# Patient Record
Sex: FEMALE | Race: WHITE | HISPANIC OR LATINO | Employment: FULL TIME | URBAN - METROPOLITAN AREA
[De-identification: names, ages, dates, MRNs, and addresses within clinical notes are randomized per-mention and may not be internally consistent; named-entity substitution may affect disease eponyms.]

---

## 2017-01-23 ENCOUNTER — TELEPHONE (OUTPATIENT)
Dept: OBSTETRICS AND GYNECOLOGY | Facility: CLINIC | Age: 37
End: 2017-01-23

## 2017-01-23 DIAGNOSIS — Z34.90 PREGNANCY, UNSPECIFIED GESTATIONAL AGE: Primary | ICD-10-CM

## 2017-01-23 LAB
ABO + RH BLD: NORMAL
HBV SURFACE AG SERPL QL IA: NEGATIVE
HCT VFR BLD AUTO: 36 % (ref 36–46)
HGB BLD-MCNC: 12.6 G/DL (ref 12–16)
HIV 1+2 AB+HIV1 P24 AG SERPL QL IA: NORMAL
INDIRECT COOMBS: NEGATIVE
MCV RBC AUTO: 89 FL (ref 82–108)
PLATELET # BLD AUTO: 361 K/ΜL (ref 150–399)
RPR: NORMAL
RUBELLA IMMUNE STATUS: 7.23

## 2017-02-06 ENCOUNTER — LAB VISIT (OUTPATIENT)
Dept: LAB | Facility: HOSPITAL | Age: 37
End: 2017-02-06
Attending: OBSTETRICS & GYNECOLOGY
Payer: COMMERCIAL

## 2017-02-06 ENCOUNTER — INITIAL PRENATAL (OUTPATIENT)
Dept: OBSTETRICS AND GYNECOLOGY | Facility: CLINIC | Age: 37
End: 2017-02-06
Payer: COMMERCIAL

## 2017-02-06 ENCOUNTER — PROCEDURE VISIT (OUTPATIENT)
Dept: OBSTETRICS AND GYNECOLOGY | Facility: CLINIC | Age: 37
End: 2017-02-06
Payer: COMMERCIAL

## 2017-02-06 VITALS
WEIGHT: 152.75 LBS | DIASTOLIC BLOOD PRESSURE: 72 MMHG | SYSTOLIC BLOOD PRESSURE: 112 MMHG | BODY MASS INDEX: 26.22 KG/M2

## 2017-02-06 DIAGNOSIS — Z34.90 PREGNANCY, UNSPECIFIED GESTATIONAL AGE: ICD-10-CM

## 2017-02-06 DIAGNOSIS — Z34.01 ENCOUNTER FOR SUPERVISION OF NORMAL FIRST PREGNANCY IN FIRST TRIMESTER: Primary | ICD-10-CM

## 2017-02-06 DIAGNOSIS — Z34.01 ENCOUNTER FOR SUPERVISION OF NORMAL FIRST PREGNANCY IN FIRST TRIMESTER: ICD-10-CM

## 2017-02-06 LAB
GLUCOSE SERPL-MCNC: 99 MG/DL
TSH SERPL DL<=0.005 MIU/L-ACNC: 1.71 UIU/ML

## 2017-02-06 PROCEDURE — 76817 TRANSVAGINAL US OBSTETRIC: CPT | Mod: S$GLB,,, | Performed by: PEDIATRICS

## 2017-02-06 PROCEDURE — 0502F SUBSEQUENT PRENATAL CARE: CPT | Mod: S$GLB,,, | Performed by: OBSTETRICS & GYNECOLOGY

## 2017-02-06 PROCEDURE — 99999 PR PBB SHADOW E&M-EST. PATIENT-LVL II: CPT | Mod: PBBFAC,,, | Performed by: OBSTETRICS & GYNECOLOGY

## 2017-02-06 PROCEDURE — 82947 ASSAY GLUCOSE BLOOD QUANT: CPT

## 2017-02-06 PROCEDURE — 84443 ASSAY THYROID STIM HORMONE: CPT

## 2017-02-06 NOTE — MR AVS SNAPSHOT
[facility]  [address]  [place] [address]  Phone: [phone]  Fax: [phone]                  Kathy DEAN   2017 1:45 PM   Initial Prenatal    Description:  Female : 1980   Provider:  Dominique Starkey MD   Department:  [facility]           Reason for Visit     Initial Prenatal Visit           Diagnoses this Visit        Comments    Encounter for supervision of normal first pregnancy in first trimester    -  Primary            To Do List           Future Appointments        Provider Department Dept Phone    3/6/2017 1:30 PM Dominique Starkey MD [facility] [phone]    3/6/2017 1:45 PM ADDITIONS, SPECIAL LWSC [facility] [phone]      Goals (5 Years of Data)     None      [facility]     [facility] -  Assistance  Registered nurses in the [facility] Center provide clinical advisement, health education, appointment booking, and other advisory services.  Call for this free service at [phone].             Medications           Message regarding Medications     Verify the changes and/or additions to your medication regime listed below are the same as discussed with your clinician today.  If any of these changes or additions are incorrect, please notify your healthcare provider.             Verify that the below list of medications is an accurate representation of the medications you are currently taking.  If none reported, the list may be blank. If incorrect, please contact your healthcare provider. Carry this list with you in case of emergency.           Current Medications     PNV95/FERROUS FUMARATE/FA (PRENATAL MULTIVITAMINS ORAL) Take by mouth.           Clinical Reference Information           Prenatal Vitals     Enc. Date GA Prenatal Vitals Prenatal Pulse Pain Level Urine Albumin/Glucose Edema Presentation Dilation/Effacement/Station    17 8w2d 112/72 / 69.3 kg (152 lb 12.5  oz)  /  / Absent  0          Your Vitals Were     BP Weight Last Period BMI       112/72 69.3 kg (152 lb 12.5 oz) 12/10/2016 26.22 kg/m2       Allergies as of 2/6/2017     No Known Allergies      Immunizations Administered on Date of Encounter - 2/6/2017     None      Orders Placed During Today's Visit      Normal Orders This Visit    C. trachomatis/N. gonorrhoeae by AMP DNA Urine     Urine culture     Future Labs/Procedures Expected by Expires    GLUCOSE, RANDOM  2/6/2017 4/7/2018    TSH  2/6/2017 4/7/2018      [facility] Sign-Up     Activating your [facility] account is as easy as 1-2-3!     1) Visit [email or web address], select Sign Up Now, enter this activation code and your date of birth, then select Next.  [email or web address]  Expires: 3/23/2017  1:49 PM      2) Create a username and password to use when you visit [facility] in the future and select a security question in case you lose your password and select Next.    3) Enter your e-mail address and click Sign Up!    Additional Information  If you have questions, please e-mail [email or web address] or call [phone] to talk to our [facility] staff. Remember, [facility] is NOT to be used for urgent needs. For medical emergencies, dial 911.         Language Assistance Services     ATTENTION: Language assistance services are available, free of charge. Please call [phone].      ATENCIÓN: Si habla español, tiene a forrest disposición servicios gratuitos de asistencia lingüística. Llame al [phone].     [facility] Ý: N?u b?n nói Ti?ng Vi?t, có các d?ch v? h? tr? ngôn ng? mi?n phí dành cho b?n. G?i s? [phone].         [facility]'s [facility] complies with applicable Federal civil rights laws and does not discriminate on the basis of race, color, national origin, age, disability, or sex.

## 2017-02-06 NOTE — MR AVS SNAPSHOT
[facility]  [address]  [place] [address]  Phone: [phone]  Fax: [phone]                  Kathy DEAN   2017 2:30 PM   Procedure visit    Description:  Female : 1980   Provider:  [facility]   Department:  [facility]           Diagnoses this Visit        Comments    Pregnancy, unspecified gestational age                To Do List           Future Appointments        Provider Department Dept Phone    3/6/2017 1:30 PM Dominique Starkey MD [facility] [phone]      Goals (5 Years of Data)     None      [facility]     [facility] Nurse Care Line -  Assistance  Registered nurses in the [facility] provide clinical advisement, health education, appointment booking, and other advisory services.  Call for this free service at [phone].             Medications           Message regarding Medications     Verify the changes and/or additions to your medication regime listed below are the same as discussed with your clinician today.  If any of these changes or additions are incorrect, please notify your healthcare provider.             Verify that the below list of medications is an accurate representation of the medications you are currently taking.  If none reported, the list may be blank. If incorrect, please contact your healthcare provider. Carry this list with you in case of emergency.           Current Medications     PNV95/FERROUS FUMARATE/FA (PRENATAL MULTIVITAMINS ORAL) Take by mouth.           Clinical Reference Information           Prenatal Vitals     Enc. Date GA Prenatal Vitals Prenatal Pulse Pain Level Urine Albumin/Glucose Edema Presentation Dilation/Effacement/Station    17 8w2d 112/72 / 69.3 kg (152 lb 12.5 oz)  /  / Absent  0          Your Vitals Were     Last Period                   12/10/2016           Allergies as of 2017     No Known Allergies      Immunizations  Administered on Date of Encounter - 2/6/2017     None      Orders Placed During Today's Visit      Normal Orders This Visit    US OB/GYN Procedure (Viewpoint) - Extended List       [facility] Sign-Up     Activating your [facility] account is as easy as 1-2-3!     1) Visit [email or web address], select Sign Up Now, enter this activation code and your date of birth, then select Next.  [email or web address]  Expires: 3/23/2017  1:49 PM      2) Create a username and password to use when you visit [facility] in the future and select a security question in case you lose your password and select Next.    3) Enter your e-mail address and click Sign Up!    Additional Information  If you have questions, please e-mail [email or web address] or call [phone] to talk to our [facility] staff. Remember, [facility] is NOT to be used for urgent needs. For medical emergencies, dial 911.         Language Assistance Services     ATTENTION: Language assistance services are available, free of charge. Please call [phone].      ATENCIÓN: Si habla español, tiene a forrest disposición servicios gratuitos de asistencia lingüística. Llame al [phone].     CHÚ Ý: N?u b?n nói Ti?ng Vi?t, có các d?ch v? h? tr? ngôn ng? mi?n phí dành cho b?n. G?i s? [phone].         [facility]'s [facility] complies with applicable Federal civil rights laws and does not discriminate on the basis of race, color, national origin, age, disability, or sex.

## 2017-02-06 NOTE — PROCEDURES
Procedures     Indication:     gestation age determination(KENYA NASIR).   Maternal age (36 years).   ____________________________________________________________________________  History:     Age: 36 years. Maternal age at EDC: 36 years. : 1 Para: 0.  ____________________________________________________________________________  Dating:    LMP: 12/10/16 EDC: 17 GA by LMP: 8w2d  Current Scan on: 17 EDC: 17 GA by current scan: 8w2d      Best Overall Assessment: 17 EDC: 17 Assessed GA: 8w2d  The calculation of the gestational age by current scan was based on CRL.  The Best Overall Assessment is based on the LMP.  ____________________________________________________________________________  Early Pregnancy Assessment:    Biometry:  CRL 17.9 mm 79th% 8w2d (8w0d to 8w4d)  Gestational Sac present. Yolk Sac present. Embryo present.     Heart activity: present. Heart rate: 178 bpm.     ____________________________________________________________________________  Maternal Structures:    Cervix: Cervical length: 42 mm.  Right Ovary: normal.   Right Ovary size: 19 mm x 14 mm x 27 mm. Volume: 3.8 ml.   Left Ovary: normal.   Left Ovary size: 27 mm x 16 mm x 26 mm. Volume: 5.9 ml.  Cul de Sac / Pouch of Fernando: no free fluid visible.  ____________________________________________________________________________  Report Summary:    Impression:     Single viable intrauterine pregnancy consistent with LMP dating.  Embryo grossly WNL with normal cardiac activity.    Normal uterus, cervix and adnexae as noted above.  No fluid seen in cul-de-sac.     Recommendations:     Suggest repeat scan as you feel clinically indicated.

## 2017-02-24 ENCOUNTER — TELEPHONE (OUTPATIENT)
Dept: OBSTETRICS AND GYNECOLOGY | Facility: CLINIC | Age: 37
End: 2017-02-24

## 2017-02-24 NOTE — TELEPHONE ENCOUNTER
Spoke with patient informed that I tried to call the number I was given back but it is a fax number. I informed patient that we do not get authorization for this test. Patient was quoted $810 even though she is AMA. I did explain to patient that she will meet her deductible regardless with delivery this year.       I spoke with Baltazar from [id number] asked him to look into this and see why she was quoted so much since she was AMA.

## 2017-02-24 NOTE — TELEPHONE ENCOUNTER
Patient insurance company called - Needs predetermining information for Materna 21- Pt name, ,procedure code,diagnosis,place of svc, doctors name-their phone # is [phone]

## 2017-03-06 ENCOUNTER — ROUTINE PRENATAL (OUTPATIENT)
Dept: OBSTETRICS AND GYNECOLOGY | Facility: CLINIC | Age: 37
End: 2017-03-06
Payer: COMMERCIAL

## 2017-03-06 VITALS
BODY MASS INDEX: 27.19 KG/M2 | SYSTOLIC BLOOD PRESSURE: 106 MMHG | DIASTOLIC BLOOD PRESSURE: 68 MMHG | WEIGHT: 158.38 LBS

## 2017-03-06 DIAGNOSIS — Z34.01 ENCOUNTER FOR SUPERVISION OF NORMAL FIRST PREGNANCY IN FIRST TRIMESTER: Primary | ICD-10-CM

## 2017-03-06 DIAGNOSIS — O09.521 AMA (ADVANCED MATERNAL AGE) MULTIGRAVIDA 35+, FIRST TRIMESTER: ICD-10-CM

## 2017-03-06 PROCEDURE — 0502F SUBSEQUENT PRENATAL CARE: CPT | Mod: S$GLB,,, | Performed by: OBSTETRICS & GYNECOLOGY

## 2017-03-06 PROCEDURE — 99999 PR PBB SHADOW E&M-EST. PATIENT-LVL II: CPT | Mod: PBBFAC,,, | Performed by: OBSTETRICS & GYNECOLOGY

## 2017-03-06 NOTE — MR AVS SNAPSHOT
[facility]  [address]  [place] [address]  Phone: [phone]  Fax: [phone]                  Kathy Vera   3/6/2017 1:30 PM   Routine Prenatal    Description:  Female : 1980   Provider:  Dominique Starkey MD   Department:  [facility]           Reason for Visit     Routine Prenatal Visit           Diagnoses this Visit        Comments    Encounter for supervision of normal first pregnancy in first trimester    -  Primary     AMA (advanced maternal age) multigravida 35+, first trimester                To Do List           Future Appointments        Provider Department Dept Phone    4/3/2017 2:15 PM Dominique Starkey MD [facility] [phone]    2017 2:15 PM Dominique Starkey MD [facility] [phone]      Goals (5 Years of Data)     None      Follow-Up and Disposition     Return in about 4 weeks (around 4/3/2017) for OB visit.    Follow-up and Disposition History      [facility]     [facility] On Call Nurse [facility] Line -  Assistance  Registered nurses in the [facility] On Call Center provide clinical advisement, health education, appointment booking, and other advisory services.  Call for this free service at [phone].             Medications           Message regarding Medications     Verify the changes and/or additions to your medication regime listed below are the same as discussed with your clinician today.  If any of these changes or additions are incorrect, please notify your healthcare provider.             Verify that the below list of medications is an accurate representation of the medications you are currently taking.  If none reported, the list may be blank. If incorrect, please contact your healthcare provider. Carry this list with you in case of emergency.           Current Medications     PNV95/FERROUS FUMARATE/FA (PRENATAL MULTIVITAMINS ORAL) Take by mouth.           Clinical Reference Information            Prenatal Vitals     Enc. Date GA Prenatal Vitals Prenatal Pulse Pain Level Urine Albumin/Glucose Edema Presentation Dilation/Effacement/Station    3/6/17 12w2d 106/68 / 71.9 kg (158 lb 6.4 oz)  / 158 / Absent  0 Negative / Negative None / None / None / No      2/6/17 8w2d 112/72 / 69.3 kg (152 lb 12.5 oz)  /  / Absent  0          Your Vitals Were     BP Weight Last Period BMI       106/68 71.9 kg (158 lb 6.4 oz) 12/10/2016 27.19 kg/m2       Allergies as of 3/6/2017     No Known Allergies      Immunizations Administered on Date of Encounter - 3/6/2017     None      Orders Placed During Today's Visit     Future Labs/Procedures Expected by Expires    Miscellaneous Sendout Test Blood  3/6/2017 5/5/2018      Language Assistance Services     ATTENTION: Language assistance services are available, free of charge. Please call [phone].      ATENCIÓN: Si habla español, tiene a forrest disposición servicios gratuitos de asistencia lingüística. Llame al [phone].     CHÚ Ý: N?u b?n nói Ti?ng Vi?t, có các d?ch v? h? tr? ngôn ng? mi?n phí jimh cho b?n. G?i s? [phone].         [facility] complies with applicable Federal civil rights laws and does not discriminate on the basis of race, color, national origin, age, disability, or sex.

## 2017-03-13 ENCOUNTER — TELEPHONE (OUTPATIENT)
Dept: OBSTETRICS AND GYNECOLOGY | Facility: CLINIC | Age: 37
End: 2017-03-13

## 2017-03-13 NOTE — TELEPHONE ENCOUNTER
[facility], calling to find out how long it takes to get [id number] results. Pt informed per Aaron that the results come back between 7 to 14 days.

## 2017-03-15 ENCOUNTER — TELEPHONE (OUTPATIENT)
Dept: OBSTETRICS AND GYNECOLOGY | Facility: CLINIC | Age: 37
End: 2017-03-15

## 2017-03-15 NOTE — TELEPHONE ENCOUNTER
Dr. Starkey patient called -would like to know when her Materna 21 test results will be in and can she have written results? And patient feels very congested, what else can she take besides what's on the medications list?

## 2017-03-17 ENCOUNTER — TELEPHONE (OUTPATIENT)
Dept: OBSTETRICS AND GYNECOLOGY | Facility: CLINIC | Age: 37
End: 2017-03-17

## 2017-03-17 NOTE — TELEPHONE ENCOUNTER
Spoke with patient informed KebzavaV87 results normal. Patient will  gender from the [place] office.     Also patient wanted to know what else she can take for her cold. Per Dr. Starkey there is nothing else she can take. Advised if she is having a fever she needs to go get tested for the flu. Pt not sure if she has had a fever she will start checking it. Patient will continue taking Mucinex sparingly and see how she feels next week and give us a call.

## 2017-04-03 ENCOUNTER — ROUTINE PRENATAL (OUTPATIENT)
Dept: OBSTETRICS AND GYNECOLOGY | Facility: CLINIC | Age: 37
End: 2017-04-03
Payer: COMMERCIAL

## 2017-04-03 VITALS
BODY MASS INDEX: 27.66 KG/M2 | DIASTOLIC BLOOD PRESSURE: 68 MMHG | SYSTOLIC BLOOD PRESSURE: 114 MMHG | WEIGHT: 161.19 LBS

## 2017-04-03 DIAGNOSIS — Z34.02 ENCOUNTER FOR SUPERVISION OF NORMAL FIRST PREGNANCY IN SECOND TRIMESTER: Primary | ICD-10-CM

## 2017-04-03 PROCEDURE — 99999 PR PBB SHADOW E&M-EST. PATIENT-LVL II: CPT | Mod: PBBFAC,,, | Performed by: OBSTETRICS & GYNECOLOGY

## 2017-04-03 PROCEDURE — 0502F SUBSEQUENT PRENATAL CARE: CPT | Mod: S$GLB,,, | Performed by: OBSTETRICS & GYNECOLOGY

## 2017-04-03 NOTE — MR AVS SNAPSHOT
[facility]  [address]  [place] [address]  Phone: [phone]  Fax: [phone]                  Kathy Vera   4/3/2017 2:15 PM   Routine Prenatal    Description:  Female : 1980   Provider:  Dominique Starkey MD   Department:  [facility]           Reason for Visit     Routine Prenatal Visit           Diagnoses this Visit        Comments    Encounter for supervision of normal first pregnancy in second trimester    -  Primary            To Do List           Future Appointments        Provider Department Dept Phone    2017 2:15 PM Dominique Starkey MD [facility] [phone]      Goals (5 Years of Data)     None      Follow-Up and Disposition     Return in about 4 weeks (around 2017) for OB visit.    Follow-up and Disposition History      [facility]     [facility] On Call Nurse Care Line -  Assistance  Unless otherwise directed by your provider, please contact [facility], our nurse care line that is available for  assistance.     Registered nurses in the [facility] On Call Center provide: appointment scheduling, clinical advisement, health education, and other advisory services.  Call: [phone] (toll free)               Medications           Message regarding Medications     Verify the changes and/or additions to your medication regime listed below are the same as discussed with your clinician today.  If any of these changes or additions are incorrect, please notify your healthcare provider.             Verify that the below list of medications is an accurate representation of the medications you are currently taking.  If none reported, the list may be blank. If incorrect, please contact your healthcare provider. Carry this list with you in case of emergency.           Current Medications     PNV95/FERROUS FUMARATE/FA (PRENATAL MULTIVITAMINS ORAL) Take by mouth.           Clinical Reference Information           Prenatal  Vitals     Enc. Date GA Prenatal Vitals Prenatal Pulse Pain Level Urine Albumin/Glucose Edema Presentation Dilation/Effacement/Station    4/3/17 16w2d 114/68 / 73.1 kg (161 lb 2.5 oz)  / 150 / Absent  0 Negative / Negative None / None / None / No      3/6/17 12w2d 106/68 / 71.9 kg (158 lb 6.4 oz)  / 158 / Absent  0 Negative / Negative None / None / None / No      2/6/17 8w2d 112/72 / 69.3 kg (152 lb 12.5 oz)  /  / Absent  0          Your Vitals Were     BP Weight Last Period BMI       114/68 73.1 kg (161 lb 2.5 oz) 12/10/2016 27.66 kg/m2       Allergies as of 4/3/2017     No Known Allergies      Immunizations Administered on Date of Encounter - 4/3/2017     None      Orders Placed During Today's Visit     Future Labs/Procedures Expected by Expires    [facility] Procedure (Viewpoint)  As directed 4/3/2018      Language Assistance Services     ATTENTION: Language assistance services are available, free of charge. Please call [phone].      ATENCIÓN: Si habla moni, tiene a forrest disposición servicios gratuitos de asistencia lingüística. Llame al [phone].     CHÚ Ý: N?u b?n nói Ti?ng Vi?t, có các d?ch v? h? tr? ngôn ng? mi?n phí dành cho b?n. G?i s? [phone].         [facility] complies with applicable Federal civil rights laws and does not discriminate on the basis of race, color, national origin, age, disability, or sex.

## 2017-04-25 ENCOUNTER — OFFICE VISIT (OUTPATIENT)
Dept: MATERNAL FETAL MEDICINE | Facility: CLINIC | Age: 37
End: 2017-04-25
Attending: OBSTETRICS & GYNECOLOGY
Payer: COMMERCIAL

## 2017-04-25 DIAGNOSIS — O09.513 ADVANCED MATERNAL AGE, PRIMIGRAVIDA, THIRD TRIMESTER: ICD-10-CM

## 2017-04-25 DIAGNOSIS — Z36.89 ENCOUNTER FOR ULTRASOUND TO CHECK FETAL GROWTH: ICD-10-CM

## 2017-04-25 DIAGNOSIS — O09.512 ADVANCED MATERNAL AGE, PRIMIGRAVIDA IN SECOND TRIMESTER, ANTEPARTUM: ICD-10-CM

## 2017-04-25 DIAGNOSIS — Z36.89 ENCOUNTER FOR FETAL ANATOMIC SURVEY: Primary | ICD-10-CM

## 2017-04-25 DIAGNOSIS — Z34.02 ENCOUNTER FOR SUPERVISION OF NORMAL FIRST PREGNANCY IN SECOND TRIMESTER: ICD-10-CM

## 2017-04-25 PROCEDURE — 76811 OB US DETAILED SNGL FETUS: CPT | Mod: S$GLB,,, | Performed by: PEDIATRICS

## 2017-04-25 PROCEDURE — 99499 UNLISTED E&M SERVICE: CPT | Mod: S$GLB,,, | Performed by: PEDIATRICS

## 2017-04-25 NOTE — LETTER
April 27, 2017      Dominique Starkey MD  [address]  [address]  [place] [address]           [facility]  [address]  [facility] [address]  Phone: [phone]          Patient: Kathy Vera   MR Number: [id number]   YOB: 1980   Date of Visit: 4/25/2017       Dear Dr. Dominique Starkey:    Thank you for referring Kathy Vera to me for evaluation. Attached you will find relevant portions of my assessment and plan of care.    If you have questions, please do not hesitate to call me. I look forward to following Kathy Vera along with you.    Sincerely,    Isadora Chaudhary MD    Enclosure  CC:  No Recipients    If you would like to receive this communication electronically, please contact [email or web address] or [phone] to request more information on [facility] access.    For providers and/or their staff who would like to refer a patient to [facility], please contact us through our one-stop-shop provider referral line, [facility] , at [phone].    If you feel you have received this communication in error or would no longer like to receive these types of communications, please e-mail [email or web address]

## 2017-04-27 ENCOUNTER — TELEPHONE (OUTPATIENT)
Dept: OBSTETRICS AND GYNECOLOGY | Facility: CLINIC | Age: 37
End: 2017-04-27

## 2017-04-27 ENCOUNTER — ROUTINE PRENATAL (OUTPATIENT)
Dept: OBSTETRICS AND GYNECOLOGY | Facility: CLINIC | Age: 37
End: 2017-04-27
Payer: COMMERCIAL

## 2017-04-27 VITALS
SYSTOLIC BLOOD PRESSURE: 108 MMHG | BODY MASS INDEX: 28.12 KG/M2 | DIASTOLIC BLOOD PRESSURE: 66 MMHG | WEIGHT: 163.81 LBS

## 2017-04-27 DIAGNOSIS — O99.350 PREGNANCY RELATED CARPAL TUNNEL SYNDROME, ANTEPARTUM: Primary | ICD-10-CM

## 2017-04-27 DIAGNOSIS — G56.00 PREGNANCY RELATED CARPAL TUNNEL SYNDROME, ANTEPARTUM: Primary | ICD-10-CM

## 2017-04-27 PROBLEM — O09.512 ADVANCED MATERNAL AGE, PRIMIGRAVIDA IN SECOND TRIMESTER, ANTEPARTUM: Status: ACTIVE | Noted: 2017-04-27

## 2017-04-27 PROCEDURE — 1160F RVW MEDS BY RX/DR IN RCRD: CPT | Mod: S$GLB,,, | Performed by: NURSE PRACTITIONER

## 2017-04-27 PROCEDURE — 99999 PR PBB SHADOW E&M-EST. PATIENT-LVL II: CPT | Mod: PBBFAC,,, | Performed by: NURSE PRACTITIONER

## 2017-04-27 PROCEDURE — 99213 OFFICE O/P EST LOW 20 MIN: CPT | Mod: S$GLB,,, | Performed by: NURSE PRACTITIONER

## 2017-04-27 NOTE — TELEPHONE ENCOUNTER
Dr. Starkey's pt calling, 19 wks ob, states that she is experiencing numbness in her hands and also pain in her right hand. Pt states that the pain in her hand prevents her from sleeping through the night. Pt states she called her PCP, but they do not have any availability. Pt would like to know what she should do. Pt's # [phone].

## 2017-04-27 NOTE — PROGRESS NOTES
Chief Complaint:  Obstetric Problem Visit    HPI: Kathy Vera is a 36 y.o., , at 19w5d wks here reporting tingling in bilateral hands with right greater than left and worse at night. She has had some of these symptoms prior to pregnancy as well but now worse. Denies swelling, temperature changes, or numbness/tingling on one side of the body, HA. She denies any vaginal bleeding, leaking fluids, abnormal vaginal discharge complaints.   Has initial visit with [facility] on May 3rd and switching care at that time.      Physical Exam:   FHT: 136  Bilateral Hands: + tineal sign, normal temp, 2+ pulses, normal cap refill and movement  /66  Wt 74.3 kg (163 lb 12.8 oz)  LMP 12/10/2016  BMI 28.12 kg/m2    Plan:  1. Carpal tunnel with pregnancy--exam with +Tineals sign. Discussed prevention measures and braces and night time bracing. Possible symptoms prior pregnancy and exacerbated with pregnacy

## 2017-04-27 NOTE — PROGRESS NOTES
Indication  ========    Fetal anatomy survey.    History  ======    Risk Factors  History risk factors: AMA    Pregnancy History  ==============    Maternal Lab Tests  Result: Mat T 21 - Neg  Wants to know gender: yes    Method  ======    Transabdominal ultrasound examination. View: Good view.    Pregnancy  =========    Lyons pregnancy. Number of fetuses: 1.    Dating  ======    LMP on: 12/10/2016  Cycle: regular cycle  GA by LMP 19 w + 3 d  BRENDA by LMP: 9/16/2017  Ultrasound examination on: 4/25/2017  GA by U/S based upon: AC, BPD, Femur, HC  GA by U/S 19 w + 5 d  BRENDA by U/S: 9/14/2017  Assigned: The Best Overall Assessment is based on the LMP.  Assigned GA 19 w + 3 d  Assigned BRENDA: 9/16/2017        General Evaluation  ==============    Cardiac activity: present.  bpm.  Fetal movements: visualized.  Presentation: breech.  Placenta:  Placental site: anterior.  Umbilical cord: Cord vessels: 3 vessel cord. Cord insertion: placental insertion: normal.  Amniotic fluid: Amount of AF: normal amount.    Fetal Biometry  ============    Fetal Biometry  BPD 44.4 mm 50% 19w 3d Hadlock  OFD 63.1 mm 98% 21w 4d Jignesh  .2 mm 71% 20w 0d Hadlock  .9 mm 61% 19w 6d Hadlock  Femur 30.0 mm 37% 19w 2d Hadlock  Cerebellum tr 19.8 mm 57% 19w 5d Cameron  CM 4.4 mm 34% Nicolaides  Nuchal fold 4.80 mm  Humerus 29.0 mm 52% 19w 3d Jignesh   g 56% 19w 4d Hadlock  Calculated by: Hadlock (BPD-HC-AC-FL)  EFW (lb) 0 lb  EFW (oz) 11 oz  Cephalic index 0.70 <1% Nicolaides  HC / AC 1.20 71% Hadlock  FL / BPD 0.68 39% Hadlock  FL / AC 0.21 24% Hadlock   bpm  Head / Face / Neck   6.4 mm  Nasal bone 4.7 mm        Fetal Anatomy  ============    Cranium: normal  Lateral ventricles: normal  Choroid plexus: normal  Midline falx: normal  Cavum septi pellucidi: normal  Cerebellum: normal  Cisterna magna: normal  Neck: appears normal  Nuchal  fold: normal  Lips: normal  Profile: normal  Nose: normal  RVOT: normal  LVOT: normal  4-chamber view: 4-chamber normal, septum normal  Situs: normal  Aortic arch: normal  Ductal arch: suboptimal  SVC: normal  IVC: normal  3-vessel view: normal  3-vessel-trachea view: normal  Cardiac axis: normal  Cardiac size: normal  Cardiac rhythm: normal  Rt lung: normal  Lt lung: normal  Diaphragm: normal  Cord insertion: normal  Stomach: normal  Kidneys: normal  Bladder: normal  Abdom. wall: normal  Rt kidney: normal  Lt kidney: normal  Liver: normal  Rt renal artery: normal  Lt renal artery: normal  Cervical spine: normal  Thoracic spine: normal  Lumbar spine: normal  Sacral spine: normal  Rt hand: normal  Lt hand: normal  Rt foot: normal  Lt foot: normal  Gender: female  Wants to know gender: yes  Other: Right hand closed. Left hand open    Maternal Structures  ===============    Uterus / Cervix  Uterus: Normal  Cervical length 38.4 mm  Ovaries / Tubes / Adnexa  Rt ovary: Not visualized  Lt ovary: Not visualized  Pouch of Fernando / Other Structures  Cul de Sac: Visualized        Impression  =========    Normal fetal anatomy with no obvious abnormalities. AMA. DA NWS. All other anatomy is seen.    Biometry is consistent with dating.    Normal amniotic fluid volume per qualitative assessment.  Normal placental location without evidence of previa.  Normal appearing cervical length per trans=abdominal screening.      Recommendation  ==============    Return at 30 - 32 weeks for growth (AMA) and DA.    Thank you again for allowing us to participate in the care of your patients. If you have any questions concerning today's consultation, feel free  to contact me or one of my partners. We can be reached at [phone] during normal business hours. If you have a question after normal  business hours, please contact Labor and Delivery at [phone].

## 2017-04-27 NOTE — TELEPHONE ENCOUNTER
19 5/7 week OB c/o numbness in both hands for over a month and now pain in right hand.  It is interfering with her sleep.  Reassured her that it was probably due to the increased fluid in her body pushing on nerves which is causing numbness and pain.  Recommended elevating hands as much as possible.  Pt insists on being seen.  Scheduled with Basilia today at 1300

## 2017-04-27 NOTE — PATIENT INSTRUCTIONS
Topic  General Pregnancy Information Recommended   (Unless Otherwise Contraindicated Or Restrictions Given To You By Your OB Doctor)      1. Anticipated course of prenatal care       Visits: will be Every 4 wks until 28 weeks, then every 2 weeks until 36 weeks, and then weekly until delivery.    Urine will be collected at each Obstetric visit        2. Nutrition and weight gain     Daily pre-niyah vitamin (recommend taking at night)    Additional 300 calories needed daily   No Sushi, hotdogs, unpasteurized products (milk/cheeses). No large fish such as: shark, jenny mackerel, tile, sword fish    Incorporate 12 ounces of smaller seafood/week and no more than 6oz of albacore tuna    Caffeine: 200 mg/day or 2 cups of caffeine/day    Weight gain recommendations are based off of BMI before pregnancy. Generally patients who with normal weight prior pregnancy it is recommended 25-35 pounds of weight gain during the pregnancy with an estimated weekly gain of 1 pound/wk in 2nd and 3rd Trimester.    4. Sexual Activity   Sexual activity is okay unless you are put on restrictions by your provider. I recommend urinating after intercourse    5. Exercise   Generally pre-pregnancy routine is okay to continue    Drink plenty fluids for hydration    Stop any activity that causes heavy cramping like a period or bright red bleeding and contact your provider   No extreme or contact sports    No exercise on your back for an extended period of time after 20 weeks    6. Hot Tub/Saunas   Avoid hot tubes and saunas    7. Vaccines   Influenza vaccine is recommended by CDC during flu season    Tdap (pertussis or whopping cough) recommended each pregnancy between 27 and 36 weeks    Tdap booster recommended for family and other planned direct caregivers    8. Water   Water is an important nutrient in a good diet. However, it cannot be stressed enough that during pregnancy water is essential. The body has increased circulation  through blood vessels, and without a large increase in water, pregnant women will be dehydrated. It plays an important role in decreasing constipation, preventing  contractions, decreasing swelling, and preventing dizziness. We recommend that you drink 8-10 glasses of water per day.    9. Smoking/Alcohol/Illicit Drug Use   No safe Level    Can lead to problems with pregnancy    Growth of the developing fetus     labor (delivery before 37 weeks)     rupture of the membranes (water breaking before 37 weeks)    Premature separation of the placenta (which may cause bleeding)    American College of Obstetricians and Gynecologists endorses abstinence    Can lead to babies with disabilities    10. Environmental or work hazards   Unless otherwise restricted you may continue work throughout the pregnancy    Notify your provider of any work hazards or chemical exposure concerns   11. Travel     Safe to travel up to 35 weeks    Continue to wear a seatbelt and airbags are still recommended    Drink plenty fluids    Blood clots are a concern during pregnancy with long travel. Recommend compression stockings and moving around at least every 2 hours and staying hydrated.    12. Domestic Violence     Please notify office immediately of any concerns or violence so that we can help direct you to assistance needed    [place] [employer]: [phone]    13. Childbirth classes     List of Childbirth classes from [facility] is available    14. Selecting a Pediatrician   Selecting a pediatrician before delivery is recommended   You can interview pediatricians before delivery    15. Fetal Monitoring     A simple test of your babys well-being is a kick count. After 26 weeks, fetal motion of any kind should be monitored. Further discussion at that time   16.  Labor Signs     Water break, leaking fluids from Vagina prior 37 weeks   Regular contractions,  Contractions that are more than 5-6/hour, getting stronger and painful with lower back pain, does not go away with rest and fluids    17. Postpartum Family Planning     Multiple options available from short term methods to long term reversible and irreversible methods    Discuss with provider as you get closer to delivery    18. Breastfeeding     Classes offered at [facility] and it is recommended to take a class    19. Lifting  In 2013, the National [place] for Occupational Safety and Health (NIOSH) published clinical guidelines for occupational lifting in uncomplicated pregnancies. The recommended weight limits are based on gestational age, intermittent versus repetitive lifting, time (hours/day) spent lifting, and lifting height from floor and distance in 3 front of body. In this guideline, the maximum permissible weight for a woman less than 20 weeks of gestation performing infrequent lifting is 36 pounds (16 kgs) and the maximum permissible weight at ?20 weeks is 26 pounds (12 kgs). For repetitive lifting ?1 hour/day, the maximum weights in the first and second half of pregnancy are 18 pounds (8 kgs) and 13 pounds (6 kgs), respectively, and for repetitive lifting <1 hour/day, the maximum weights are 30 pounds (14 kgs) and 22 pounds (10 kgs), respectively. Although not based on high quality evidence, these guidelines are a reasonable reference for counseling pregnant women     20. Scheduling and Provider Availability     Your Obstetric Doctor is usually here weekly but not every day. We recommend you make 3-4 advanced appointments at a time to accommodate your personal needs and work/school obligations.    We ask that you come 15 minutes prior your scheduled appointment.    For same day appointments (not routine appointments) there is a Nurse Practitioner or another obstetric provider available. Please let the  aware you are an OB patient requesting a same day appointment.

## 2017-05-03 ENCOUNTER — LAB VISIT (OUTPATIENT)
Dept: LAB | Facility: OTHER | Age: 37
End: 2017-05-03
Attending: ADVANCED PRACTICE MIDWIFE
Payer: COMMERCIAL

## 2017-05-03 ENCOUNTER — INITIAL PRENATAL (OUTPATIENT)
Dept: OBSTETRICS AND GYNECOLOGY | Facility: CLINIC | Age: 37
End: 2017-05-03
Payer: COMMERCIAL

## 2017-05-03 VITALS
DIASTOLIC BLOOD PRESSURE: 58 MMHG | BODY MASS INDEX: 28.38 KG/M2 | SYSTOLIC BLOOD PRESSURE: 104 MMHG | WEIGHT: 165.38 LBS

## 2017-05-03 DIAGNOSIS — Z34.92 PREGNANCY, OBSTETRICAL CARE, SECOND TRIMESTER: Primary | ICD-10-CM

## 2017-05-03 DIAGNOSIS — Z67.20 BLOOD TYPE B+: ICD-10-CM

## 2017-05-03 DIAGNOSIS — Z34.92 PREGNANCY, OBSTETRICAL CARE, SECOND TRIMESTER: ICD-10-CM

## 2017-05-03 PROCEDURE — 82105 ALPHA-FETOPROTEIN SERUM: CPT

## 2017-05-03 PROCEDURE — 0502F SUBSEQUENT PRENATAL CARE: CPT | Mod: S$GLB,,, | Performed by: ADVANCED PRACTICE MIDWIFE

## 2017-05-03 PROCEDURE — 36415 COLL VENOUS BLD VENIPUNCTURE: CPT

## 2017-05-03 PROCEDURE — 99999 PR PBB SHADOW E&M-EST. PATIENT-LVL III: CPT | Mod: PBBFAC,,, | Performed by: ADVANCED PRACTICE MIDWIFE

## 2017-05-03 PROCEDURE — 87086 URINE CULTURE/COLONY COUNT: CPT

## 2017-05-03 PROCEDURE — 87591 N.GONORRHOEAE DNA AMP PROB: CPT

## 2017-05-03 NOTE — PROGRESS NOTES
Kathy Vera is a 36 y.o. , presents today for a transfer of prenatal care   C/C: transfer of prenatal care  Dating via LMP c/w 8wk US    Reports + FM, denies VB, LOF or cramping.     SOCIAL HISTORY: Denies emotional/mental/physical/sexual violence or abuse. Feels safe at home. Accompanied today by Elio,  and father of baby. This is first for both! She works at [employer] and Elio is a .     Reports no long-term chronic medical conditions. Denies hx or sx of STDs.    Review of patient's allergies indicates:  No Known Allergies  Past Medical History:   Diagnosis Date    Blood type B POS 5/3/2017    Hyperlipidemia     never medicated     Past Surgical History:   Procedure Laterality Date    WISDOM TOOTH EXTRACTION       Past Surgical History:   Procedure Laterality Date    WISDOM TOOTH EXTRACTION       OB History    Para Term  AB SAB TAB Ectopic Multiple Living   1               # Outcome Date GA Lbr Gamal/2nd Weight Sex Delivery Anes PTL Lv   1 Current               Obstetric Comments   Menarche 12     OB History      Para Term  AB TAB SAB Ectopic Multiple Living    1                 Obstetric Comments    Menarche 12        Social History     Social History    Marital status:      Spouse name: N/A    Number of children: N/A    Years of education: N/A     Occupational History    Not on file.     Social History Main Topics    Smoking status: Never Smoker    Smokeless tobacco: Not on file    Alcohol use No      Comment: Pregnant     Drug use: No    Sexual activity: Yes     Birth control/ protection: None      Comment:  : Elio     Other Topics Concern    Not on file     Social History Narrative    Elio is a     She works at [employer]    First baby for both!     Family History   Problem Relation Age of Onset    Hypertension Mother     Breast cancer Neg Hx     Colon cancer Neg Hx     Ovarian cancer Neg Hx      History    Sexual Activity    Sexual activity: Yes    Birth control/ protection: None     Comment:  : Elio         OBJECTIVE: see prenatal flow sheet  BP (!) 104/58  Wt 75 kg (165 lb 5.5 oz)  LMP 12/10/2016  BMI 28.38 kg/m2  Constitutional/Gen: NAD, appears stated age, well groomed  Lung: normal resp effort  Extremities: FROM, with no edema or tenderness.  Neurologic: A&O x 4, non-focal, cranial nerves 2-12 grossly intact  Psych: affect appropriate and without signs of mood, thought or memory difficulty appreciated    ASSESSMENT:  36 y.o. female  at 20w4d for transfer of prenatal care  Body mass index is 28.38 kg/(m^2).  Patient Active Problem List   Diagnosis    Advanced maternal age, primigravida in second trimester, antepartum    Blood type B POS    Pre preg BMI 24       PLAN:  1. Transfer of prenatal care within [facility] system  -- Continue prenatal care  -- Urine culture and GC/CT today.    2. BMI 24  -- Discussed IOM recommended weight gain of:   Weight category Pre pre BMI  Recommended TWG   Underweight Less than 18.5 28-40    Normal Weight 18.5-24.9  25-35    Overweight 25-29.9  15-25    Obese   30 and greater  11-20   -- Discussed criteria for delivery at [facility] r/t excessive pre-preg weight or excessive weight gain:   Pre-pregnancy BMI over 40 or excess pregnancy weight gain defined as:   Pre-preg BMI < 18.5; Excess weight gain = > 60 pound   Pre-preg BMI 18.5-24.9;  Excess weight gain = > 53 pounds   Pre-preg BMI 25-29.9;  Excess weight gain = > 38 pounds   Pre-preg BMI > 30;  Excess weight gain = > 30 pounds    3. Oriented to practice and midwifery service  -- Pregnancy education and couseling; handouts and booklet provided  -- She has already attended [facility] and has toured facility  -- Reviewed anticipated prenatal course of care and how to contact us  -- Reviewed [facility] guidelines and admission, exclusion, and transfer criteria  -- Precautions/warning signs reviewed  -- Common complaints  of pregnancy  -- Routine prenatal labs including HIV  -- Ultrasounds  -- Childbirth education/hospital/[facility] facilities  -- Nutrition, prepregnant BMI, and recommended weight gain  -- Toxoplasmosis precautions (Cats/Raw Meat)  -- Sexual activity and exercise  -- Environmental/Work hazards  -- Travel  -- Tobacco (Ask, Advise, Assess, Assist, and Arrange), as well as alcohol and drug use  -- Use of any medications (Including supplements, Vitamins, Herbs, or OTC Drugs)  -- Domestic violence screen negative    4. Reviewed genetic testing options. Reviewed available first trimester and/or second  trimester screening options. Reviewed risk of false positive/negative results and recommendation of referral to [facility] in event of a positive result, for NIPT, US, and/or amniocentesis. Reviewed the possible estimated 1 in 300/500 risk of miscarriage with amniocentesis, even with a healthy fetus. Patient already had NIPT - she reports as negative. AFP only offered and ordered today.     5. AMA  -- NIPT neg. AFP today.     6. Reviewed warning signs, precautions, and how/when to contact us.     7. RTC x 4 weeks, call or present sooner prn.     Updated Medication List:  Current Outpatient Prescriptions   Medication Sig Dispense Refill    PNV95/FERROUS FUMARATE/FA (PRENATAL MULTIVITAMINS ORAL) Take by mouth.       No current facility-administered medications for this visit.          Dorothy Red CNM/NP  5/3/2017 6:33 PM

## 2017-05-04 LAB
BACTERIA UR CULT: NO GROWTH
C TRACH DNA SPEC QL NAA+PROBE: NOT DETECTED
N GONORRHOEA DNA SPEC QL NAA+PROBE: NOT DETECTED

## 2017-05-05 LAB
AFP INTERPRETATION: NORMAL
AFP MATERNAL: NEGATIVE
ALPHA FETOPROTEIN MATERNAL: 65.9 NG/ML
ETHNIC ORIGIN: NORMAL
GESTATIONAL AGE (DAYS): 4
GESTATIONAL AGE (WEEKS): 20
GESTATIONAL AGE METHOD: NORMAL
INSULIN DEPEND. DIABETES: NORMAL
M.O.M. ALPHA FETOPROTEIN: 1.16
MATERNAL AGE AT EDD (YRS): 37
MATERNAL WEIGHT (LBS): 165
MULTIPLE GESTATION: NORMAL

## 2017-05-08 ENCOUNTER — TELEPHONE (OUTPATIENT)
Dept: OBSTETRICS AND GYNECOLOGY | Facility: HOSPITAL | Age: 37
End: 2017-05-08

## 2017-05-08 NOTE — TELEPHONE ENCOUNTER
Returned pt phone call.  No answer, left message asking patient to call back and advising to go come to ER if hit her stomach or is having pain, contractions or bleeding.

## 2017-05-12 ENCOUNTER — TELEPHONE (OUTPATIENT)
Dept: OBSTETRICS AND GYNECOLOGY | Facility: CLINIC | Age: 37
End: 2017-05-12

## 2017-05-12 NOTE — TELEPHONE ENCOUNTER
"Yari Chavez's call  Reports pubic bone pressure - has been going on for over a month and then has pain "sharp pulling" aggravated with movement which only lasts a few seconds then resolves with change of position  Feeling "fluttering"  No VB, LOF, or uterine "menstrual like cramping"  Reviewed normal discomforts of pregnancy  Reviewed warning signs  Discussed use of maternity support belt jennifern    Dorothy Red CNM/NP  Certified Nurse Midwife/Nurse Practitioner  5/12/2017 1:45 PM      "

## 2017-05-29 ENCOUNTER — ROUTINE PRENATAL (OUTPATIENT)
Dept: OBSTETRICS AND GYNECOLOGY | Facility: CLINIC | Age: 37
End: 2017-05-29
Payer: COMMERCIAL

## 2017-05-29 VITALS
SYSTOLIC BLOOD PRESSURE: 110 MMHG | WEIGHT: 169.75 LBS | DIASTOLIC BLOOD PRESSURE: 62 MMHG | BODY MASS INDEX: 29.14 KG/M2

## 2017-05-29 DIAGNOSIS — Z34.92 PREGNANCY, OBSTETRICAL CARE, SECOND TRIMESTER: Primary | ICD-10-CM

## 2017-05-29 PROCEDURE — 0502F SUBSEQUENT PRENATAL CARE: CPT | Mod: S$GLB,,, | Performed by: ADVANCED PRACTICE MIDWIFE

## 2017-05-29 PROCEDURE — 99999 PR PBB SHADOW E&M-EST. PATIENT-LVL I: CPT | Mod: PBBFAC,,, | Performed by: ADVANCED PRACTICE MIDWIFE

## 2017-05-29 NOTE — PROGRESS NOTES
36 y.o. female  at 24w2d by LMP c/w 8wk US  Reports + FM, denies VB, LOF, or cramping  TWG 27# for pre preg BMI of 24  AMA  -- Consider 3T US  Reviewed upcoming 28wk labs, (B POS) and orders placed  Discussed [facility] handout   Reviewed warning signs, normal FM,  labor precautions and how/when to call.  RTC x 4 wks, call or present sooner prn.

## 2017-06-26 ENCOUNTER — LAB VISIT (OUTPATIENT)
Dept: LAB | Facility: OTHER | Age: 37
End: 2017-06-26
Attending: ADVANCED PRACTICE MIDWIFE
Payer: COMMERCIAL

## 2017-06-26 ENCOUNTER — ROUTINE PRENATAL (OUTPATIENT)
Dept: OBSTETRICS AND GYNECOLOGY | Facility: CLINIC | Age: 37
End: 2017-06-26
Attending: OBSTETRICS & GYNECOLOGY
Payer: COMMERCIAL

## 2017-06-26 VITALS
BODY MASS INDEX: 29.42 KG/M2 | WEIGHT: 171.44 LBS | DIASTOLIC BLOOD PRESSURE: 86 MMHG | SYSTOLIC BLOOD PRESSURE: 100 MMHG

## 2017-06-26 DIAGNOSIS — Z34.92 PREGNANCY, OBSTETRICAL CARE, SECOND TRIMESTER: ICD-10-CM

## 2017-06-26 DIAGNOSIS — Z3A.28 28 WEEKS GESTATION OF PREGNANCY: Primary | ICD-10-CM

## 2017-06-26 DIAGNOSIS — O09.512 ADVANCED MATERNAL AGE, PRIMIGRAVIDA IN SECOND TRIMESTER, ANTEPARTUM: ICD-10-CM

## 2017-06-26 LAB
ANISOCYTOSIS BLD QL SMEAR: SLIGHT
BASOPHILS # BLD AUTO: 0.01 K/UL
BASOPHILS NFR BLD: 0.1 %
DIFFERENTIAL METHOD: ABNORMAL
EOSINOPHIL # BLD AUTO: 0.3 K/UL
EOSINOPHIL NFR BLD: 2.6 %
ERYTHROCYTE [DISTWIDTH] IN BLOOD BY AUTOMATED COUNT: 15.2 %
GLUCOSE SERPL-MCNC: 143 MG/DL
HCT VFR BLD AUTO: 33.7 %
HGB BLD-MCNC: 11.1 G/DL
HYPOCHROMIA BLD QL SMEAR: ABNORMAL
LYMPHOCYTES # BLD AUTO: 1.8 K/UL
LYMPHOCYTES NFR BLD: 17.7 %
MCH RBC QN AUTO: 30.2 PG
MCHC RBC AUTO-ENTMCNC: 32.9 %
MCV RBC AUTO: 92 FL
MONOCYTES # BLD AUTO: 0.6 K/UL
MONOCYTES NFR BLD: 5.5 %
NEUTROPHILS # BLD AUTO: 7.2 K/UL
NEUTROPHILS NFR BLD: 72.2 %
OVALOCYTES BLD QL SMEAR: ABNORMAL
PLATELET # BLD AUTO: 276 K/UL
PLATELET BLD QL SMEAR: ABNORMAL
PMV BLD AUTO: 8.9 FL
POIKILOCYTOSIS BLD QL SMEAR: SLIGHT
RBC # BLD AUTO: 3.68 M/UL
SCHISTOCYTES BLD QL SMEAR: ABNORMAL
SCHISTOCYTES BLD QL SMEAR: PRESENT
SICKLE CELLS BLD QL SMEAR: ABNORMAL
WBC # BLD AUTO: 10.01 K/UL

## 2017-06-26 PROCEDURE — 36415 COLL VENOUS BLD VENIPUNCTURE: CPT

## 2017-06-26 PROCEDURE — 85025 COMPLETE CBC W/AUTO DIFF WBC: CPT

## 2017-06-26 PROCEDURE — 99999 PR PBB SHADOW E&M-EST. PATIENT-LVL II: CPT | Mod: PBBFAC,,, | Performed by: ADVANCED PRACTICE MIDWIFE

## 2017-06-26 PROCEDURE — 0502F SUBSEQUENT PRENATAL CARE: CPT | Mod: S$GLB,,, | Performed by: ADVANCED PRACTICE MIDWIFE

## 2017-06-26 PROCEDURE — 82950 GLUCOSE TEST: CPT

## 2017-06-26 NOTE — PROGRESS NOTES
" is a  based in [place]. He is often gone for several days at a time. He is going to check with his employers regarding plans to get him home when she is in labor.  Getting 28 week labs done today.  C/o carpal tunnel syndrome on left wrist>right. Reports severe pain in the morning and is concerned that her thumb is "locking." Has tried wrist guard with only temporary relief. Will see PCP for evaluation.  Planning to take the [facility] class; encouraged to sign up asap.  Breastfeeding flip chart reviewed.  TWG 29 lb. Has decreased the amount of chocolate milk she drinks. Choosemyplate.gov recommended for guidance.   "

## 2017-06-27 ENCOUNTER — TELEPHONE (OUTPATIENT)
Dept: OBSTETRICS AND GYNECOLOGY | Facility: CLINIC | Age: 37
End: 2017-06-27

## 2017-06-27 DIAGNOSIS — R73.09 ELEVATED GLUCOSE: Primary | ICD-10-CM

## 2017-06-27 NOTE — TELEPHONE ENCOUNTER
Returned patient phone call.  Pt c/o cold and URI sx.  Concerned if these sx can hurt the baby.  Advised high fever or respiratory distress can be cause for concern but routine cold sx are managed with comfort measures such as Tylenol and saline nasal spray, not a cause of concern for the baby.

## 2017-06-29 ENCOUNTER — TELEPHONE (OUTPATIENT)
Dept: OBSTETRICS AND GYNECOLOGY | Facility: HOSPITAL | Age: 37
End: 2017-06-29

## 2017-06-29 NOTE — TELEPHONE ENCOUNTER
Left voicemail following up on her call from yesterday regarding elevated 1hr    Dorothy Red CNM/NP  Certified Nurse Midwife/Nurse Practitioner  6/29/2017 7:42 AM

## 2017-06-30 ENCOUNTER — LAB VISIT (OUTPATIENT)
Dept: LAB | Facility: OTHER | Age: 37
End: 2017-06-30
Attending: INTERNAL MEDICINE
Payer: COMMERCIAL

## 2017-06-30 ENCOUNTER — PATIENT MESSAGE (OUTPATIENT)
Dept: OBSTETRICS AND GYNECOLOGY | Facility: CLINIC | Age: 37
End: 2017-06-30

## 2017-06-30 ENCOUNTER — TELEPHONE (OUTPATIENT)
Dept: OBSTETRICS AND GYNECOLOGY | Facility: CLINIC | Age: 37
End: 2017-06-30

## 2017-06-30 DIAGNOSIS — R73.09 ELEVATED GLUCOSE: ICD-10-CM

## 2017-06-30 DIAGNOSIS — O09.522 SUPERVISION OF ELDERLY MULTIGRAVIDA, SECOND TRIMESTER: Primary | ICD-10-CM

## 2017-06-30 LAB
GLUCOSE SERPL-MCNC: 127 MG/DL
GLUCOSE SERPL-MCNC: 130 MG/DL
GLUCOSE SERPL-MCNC: 175 MG/DL
GLUCOSE SERPL-MCNC: 86 MG/DL

## 2017-06-30 PROCEDURE — 36415 COLL VENOUS BLD VENIPUNCTURE: CPT

## 2017-06-30 PROCEDURE — 82951 GLUCOSE TOLERANCE TEST (GTT): CPT

## 2017-07-01 ENCOUNTER — PATIENT MESSAGE (OUTPATIENT)
Dept: OBSTETRICS AND GYNECOLOGY | Facility: CLINIC | Age: 37
End: 2017-07-01

## 2017-07-10 ENCOUNTER — ROUTINE PRENATAL (OUTPATIENT)
Dept: OBSTETRICS AND GYNECOLOGY | Facility: CLINIC | Age: 37
End: 2017-07-10
Payer: COMMERCIAL

## 2017-07-10 VITALS — SYSTOLIC BLOOD PRESSURE: 106 MMHG | DIASTOLIC BLOOD PRESSURE: 66 MMHG | BODY MASS INDEX: 29.78 KG/M2 | WEIGHT: 173.5 LBS

## 2017-07-10 DIAGNOSIS — O09.512 ADVANCED MATERNAL AGE, PRIMIGRAVIDA IN SECOND TRIMESTER, ANTEPARTUM: Primary | ICD-10-CM

## 2017-07-10 DIAGNOSIS — O26.899 CARPAL TUNNEL SYNDROME DURING PREGNANCY: ICD-10-CM

## 2017-07-10 DIAGNOSIS — Z3A.30 30 WEEKS GESTATION OF PREGNANCY: ICD-10-CM

## 2017-07-10 DIAGNOSIS — G56.00 CARPAL TUNNEL SYNDROME DURING PREGNANCY: ICD-10-CM

## 2017-07-10 PROCEDURE — 99999 PR PBB SHADOW E&M-EST. PATIENT-LVL II: CPT | Mod: PBBFAC,,, | Performed by: ADVANCED PRACTICE MIDWIFE

## 2017-07-10 PROCEDURE — 0502F SUBSEQUENT PRENATAL CARE: CPT | Mod: S$GLB,,, | Performed by: ADVANCED PRACTICE MIDWIFE

## 2017-07-10 NOTE — PROGRESS NOTES
Cold symptoms resolving.  Ultrasound 3T secondary to AMA scheduled.  Diet and weight gain discussed. Gained two pounds in past two weeks with TWG of 31LBs at 30 weeks gestation. Has controled her weight gain since cutting back on chocolate milk.  Saw a doctor about her carpal tunnel symptoms and is having wrists splints made.  Planning to take hypnobirthing classes. Is signed up but waiting for 's schedule. Is considering a ; has contacted one and awaiting a call back.  Tdap ordered  FMR protocol, S&S PTL and 3T warning signs all reviewed.  Is looking for a pediatrician; has a short list.

## 2017-07-27 ENCOUNTER — ROUTINE PRENATAL (OUTPATIENT)
Dept: OBSTETRICS AND GYNECOLOGY | Facility: CLINIC | Age: 37
End: 2017-07-27
Payer: COMMERCIAL

## 2017-07-27 ENCOUNTER — CLINICAL SUPPORT (OUTPATIENT)
Dept: OBSTETRICS AND GYNECOLOGY | Facility: CLINIC | Age: 37
End: 2017-07-27
Payer: COMMERCIAL

## 2017-07-27 ENCOUNTER — OFFICE VISIT (OUTPATIENT)
Dept: MATERNAL FETAL MEDICINE | Facility: CLINIC | Age: 37
End: 2017-07-27
Attending: OBSTETRICS & GYNECOLOGY
Payer: COMMERCIAL

## 2017-07-27 VITALS — WEIGHT: 177.69 LBS | SYSTOLIC BLOOD PRESSURE: 114 MMHG | BODY MASS INDEX: 30.5 KG/M2 | DIASTOLIC BLOOD PRESSURE: 68 MMHG

## 2017-07-27 DIAGNOSIS — O09.513 ADVANCED MATERNAL AGE, PRIMIGRAVIDA, THIRD TRIMESTER: ICD-10-CM

## 2017-07-27 DIAGNOSIS — Z36.89 ENCOUNTER FOR ULTRASOUND TO CHECK FETAL GROWTH: ICD-10-CM

## 2017-07-27 DIAGNOSIS — Z23 NEED FOR TDAP VACCINATION: Primary | ICD-10-CM

## 2017-07-27 DIAGNOSIS — Z34.93 PRENATAL CARE IN THIRD TRIMESTER: Primary | ICD-10-CM

## 2017-07-27 PROCEDURE — 0502F SUBSEQUENT PRENATAL CARE: CPT | Mod: S$GLB,,, | Performed by: ADVANCED PRACTICE MIDWIFE

## 2017-07-27 PROCEDURE — 90471 IMMUNIZATION ADMIN: CPT | Mod: S$GLB,,, | Performed by: ADVANCED PRACTICE MIDWIFE

## 2017-07-27 PROCEDURE — 99499 UNLISTED E&M SERVICE: CPT | Mod: S$GLB,,, | Performed by: OBSTETRICS & GYNECOLOGY

## 2017-07-27 PROCEDURE — 90715 TDAP VACCINE 7 YRS/> IM: CPT | Mod: S$GLB,,, | Performed by: ADVANCED PRACTICE MIDWIFE

## 2017-07-27 PROCEDURE — 76816 OB US FOLLOW-UP PER FETUS: CPT | Mod: S$GLB,,, | Performed by: OBSTETRICS & GYNECOLOGY

## 2017-07-27 PROCEDURE — 99999 PR PBB SHADOW E&M-EST. PATIENT-LVL II: CPT | Mod: PBBFAC,,, | Performed by: ADVANCED PRACTICE MIDWIFE

## 2017-07-27 PROCEDURE — 99999 PR PBB SHADOW E&M-EST. PATIENT-LVL I: CPT | Mod: PBBFAC,,,

## 2017-07-27 NOTE — LETTER
July 27, 2017      Dominique Starkey MD  [address]  [address]  [place] [address]           [facility]  [address] [facility] [address]  Phone: [phone]          Patient: Kathy Vera   MR Number: [id number]   YOB: 1980   Date of Visit: 7/27/2017       Dear Dr. Dominique Starkey:    Thank you for referring Kathy Vera to me for evaluation. Attached you will find relevant portions of my assessment and plan of care.    If you have questions, please do not hesitate to call me. I look forward to following Kathy Vera along with you.    Sincerely,    Keyona Alexander MD    Enclosure  CC:  No Recipients    If you would like to receive this communication electronically, please contact [email or web address] or [phone] to request more information on [employer] access.    For providers and/or their staff who would like to refer a patient to [facility], please contact us through our one-stop-shop provider referral line, [facility], at [phone].    If you feel you have received this communication in error or would no longer like to receive these types of communications, please e-mail [email or web address]

## 2017-07-27 NOTE — PROGRESS NOTES
36 y.o. female  at 32w5d by   Reports + FM, denies VB, LOF or CTX  Doing well    TW lbs, weight gain limits discussed  Reviewed warning signs, normal FKCs,  labor precautions and how/when to call.  RTC x 2 wks, call or present sooner prn.

## 2017-07-27 NOTE — PROGRESS NOTES
OB Ultrasound Report (see PDF version under imaging tab)    Indication  ========    Evaluation of fetal growth; AMA.    History  ======    Risk Factors  History risk factors: AMA    Pregnancy History  ===============    Maternal Lab Tests  Result: Mat T 21 - Neg  Wants to know gender: yes    Method  ======    Transabdominal ultrasound examination, [place]on E10. View: Suboptimal view: limited by fetal position.    Pregnancy  =========    Lyons pregnancy. Number of fetuses: 1.    Dating  ======    LMP on: 12/10/2016  Cycle: regular cycle  GA by LMP 32 w + 5 d  BRENDA by LMP: 9/16/2017  Ultrasound examination on: 7/27/2017  GA by U/S based upon: AC, BPD, Femur, HC  GA by U/S 33 w + 3 d  BRENDA by U/S: 9/11/2017  Assigned: The Best Overall Assessment is based on the LMP.  Assigned GA 32 w + 5 d  Assigned BRENDA: 9/16/2017    General Evaluation  ===============    Cardiac activity: present.  bpm.  Fetal movements: visualized.  Presentation: cephalic.  Placenta: anterior.  Umbilical cord: 3 vessel cord.  Amniotic fluid: Amount of AF: normal amount. MVP 5.2 cm.    Fetal Biometry  ===========    Fetal Biometry  BPD 80.6 mm 32w 3d Hadlock  .5 mm 35w 1d Jignesh  .5 mm 33w 2d Hadlock  .7 mm 36w 1d Hadlock  Femur 61.3 mm 31w 6d Hadlock  EFW 2,409 g 51% Abdirashid  Calculated by: Hadlock (BPD-HC-AC-FL)  EFW (lb) 5 lb  EFW (oz) 5 oz  Cephalic index 0.76  HC / AC 0.93  FL / BPD 0.76  FL / AC 0.19  MVP 5.2 cm   bpm    Fetal Anatomy  ===========    Cranium: normal  Posterior fossa: normal  4-chamber view: suboptimal  Stomach: normal  Kidneys: normal  Bladder: normal  Arms: both visualized  Legs: both visualized  Wants to know gender: yes  Other: A full anatomy survey previously performed.    Impression  =========    Fetal size is AGA with the EFW at the 51st percentile.  Normal repeat limited fetal anatomic survey. AFV is normal. Follow-up ultrasound as clinically indicated.

## 2017-08-03 ENCOUNTER — PATIENT MESSAGE (OUTPATIENT)
Dept: OBSTETRICS AND GYNECOLOGY | Facility: CLINIC | Age: 37
End: 2017-08-03

## 2017-08-08 ENCOUNTER — PATIENT MESSAGE (OUTPATIENT)
Dept: OBSTETRICS AND GYNECOLOGY | Facility: CLINIC | Age: 37
End: 2017-08-08

## 2017-08-10 ENCOUNTER — ROUTINE PRENATAL (OUTPATIENT)
Dept: OBSTETRICS AND GYNECOLOGY | Facility: CLINIC | Age: 37
End: 2017-08-10
Payer: COMMERCIAL

## 2017-08-10 VITALS — WEIGHT: 179 LBS | SYSTOLIC BLOOD PRESSURE: 106 MMHG | BODY MASS INDEX: 30.73 KG/M2 | DIASTOLIC BLOOD PRESSURE: 64 MMHG

## 2017-08-10 DIAGNOSIS — O09.512 ADVANCED MATERNAL AGE, PRIMIGRAVIDA IN SECOND TRIMESTER, ANTEPARTUM: Primary | ICD-10-CM

## 2017-08-10 PROCEDURE — 0502F SUBSEQUENT PRENATAL CARE: CPT | Mod: S$GLB,,, | Performed by: ADVANCED PRACTICE MIDWIFE

## 2017-08-10 PROCEDURE — 99999 PR PBB SHADOW E&M-EST. PATIENT-LVL II: CPT | Mod: PBBFAC,,, | Performed by: ADVANCED PRACTICE MIDWIFE

## 2017-08-10 NOTE — PROGRESS NOTES
36 y.o. female  at 34w5d by LMP c/w 8wk US  Reports + FM, denies VB, LOF or CTX  Discussed lower extremity edema; encouraged compression stockings and elevation  Still deciding on pediatrician  TW lbs for pre preg BMI of 24  AMA. Already had 3T US  Reviewed 28wk lab results (B POS)  Reviewed upcoming 36wk labs   Reviewed warning signs, normal FKCs,  labor precautions and how/when to call.  RTC x 1-2 wks, call or present sooner prn.

## 2017-08-21 ENCOUNTER — ROUTINE PRENATAL (OUTPATIENT)
Dept: OBSTETRICS AND GYNECOLOGY | Facility: CLINIC | Age: 37
End: 2017-08-21
Attending: OBSTETRICS & GYNECOLOGY
Payer: COMMERCIAL

## 2017-08-21 ENCOUNTER — HOSPITAL ENCOUNTER (OUTPATIENT)
Dept: PERINATAL CARE | Facility: OTHER | Age: 37
Discharge: HOME | End: 2017-08-21
Attending: ADVANCED PRACTICE MIDWIFE
Payer: COMMERCIAL

## 2017-08-21 VITALS — WEIGHT: 183 LBS | SYSTOLIC BLOOD PRESSURE: 116 MMHG | DIASTOLIC BLOOD PRESSURE: 60 MMHG | BODY MASS INDEX: 31.41 KG/M2

## 2017-08-21 DIAGNOSIS — Z3A.36 36 WEEKS GESTATION OF PREGNANCY: ICD-10-CM

## 2017-08-21 DIAGNOSIS — O36.8131 DECREASED FETAL MOVEMENT AFFECTING MANAGEMENT OF PREGNANCY IN THIRD TRIMESTER, FETUS 1: ICD-10-CM

## 2017-08-21 DIAGNOSIS — O09.513 ELDERLY PRIMIGRAVIDA IN THIRD TRIMESTER: Primary | ICD-10-CM

## 2017-08-21 PROCEDURE — 59025 FETAL NON-STRESS TEST: CPT | Mod: 26,S$GLB,, | Performed by: OBSTETRICS & GYNECOLOGY

## 2017-08-21 PROCEDURE — 99999 PR PBB SHADOW E&M-EST. PATIENT-LVL II: CPT | Mod: PBBFAC,,, | Performed by: ADVANCED PRACTICE MIDWIFE

## 2017-08-21 PROCEDURE — 59025 FETAL NON-STRESS TEST: CPT

## 2017-08-21 PROCEDURE — 87081 CULTURE SCREEN ONLY: CPT

## 2017-08-21 PROCEDURE — 0502F SUBSEQUENT PRENATAL CARE: CPT | Mod: S$GLB,,, | Performed by: ADVANCED PRACTICE MIDWIFE

## 2017-08-21 PROCEDURE — 76815 OB US LIMITED FETUS(S): CPT | Mod: 26,S$GLB,, | Performed by: OBSTETRICS & GYNECOLOGY

## 2017-08-21 PROCEDURE — 76815 OB US LIMITED FETUS(S): CPT

## 2017-08-21 NOTE — PROGRESS NOTES
Indication  ========    NST: MVP/decreased fetal movement.    History  ======    Risk Factors  History risk factors: AMA    Maternal Assessment  =================    BP syst 116 mmHg  BP diast 60 mmHg    Method  ======    Transabdominal ultrasound examination. View: Sufficient.    Pregnancy  =========    Lyons pregnancy. Number of fetuses: 1.    Dating  ======    LMP on: 12/10/2016  Cycle: regular cycle  GA by LMP 36 w + 2 d  BRENDA by LMP: 2017  Assigned: The Best Overall Assessment is based on the LMP.  Assigned GA 36 w + 2 d  Assigned BRENDA: 2017    General Evaluation  ==============    Cardiac activity: present.  bpm.  Fetal movements: visualized.  Presentation: cephalic.  Placenta:  Placental site: anterior.  Umbilical cord: Cord vessels: 3 vessel cord.    Non Stress Test  =============    NST interpretation: reactive. Test duration 29 min. Baseline  bpm. Baseline variability: moderate. Accelerations: present.  Decelerations: absent. Uterine activity: present, x1 ctx. Acoustic stimulation: no  reports + fetal movement,less than usual. Denies ctx, vag bleeding or loss of fluid. reviewed FMC, written instructions given.    Amniotic Fluid Assessment  =====================    MVP 6.5 cm    Impression  =========    Reactive and reassuring NST.  Normal amniotic fluid volume.    Recommendation  ==============    Continue  fetal surveillance as clinically indicated.

## 2017-08-21 NOTE — PROGRESS NOTES
Started hypnobirthing class and has been doing our free classes. Did the breastfeeding class at [facility].  GBS today. HIV/RPR ordered.  Breastfeeding flip chart reviewed.  Will write a birth plan.  Questions answered.  Planning to see dermatologist regarding moles.  Reports

## 2017-08-21 NOTE — PROGRESS NOTES
Reports decreased fetal movement. FMR protocol reviewed. Stated she is not sure she counted 10 movements in two hours. Sent to prenatal testing for NST and MVI. Will also be able to confirm presentation.

## 2017-08-23 ENCOUNTER — DOCUMENTATION ONLY (OUTPATIENT)
Dept: OBSTETRICS AND GYNECOLOGY | Facility: HOSPITAL | Age: 37
End: 2017-08-23

## 2017-08-23 NOTE — PROGRESS NOTES
Phone call to Kathy to follow up on her report of decreased fetal movement with normal prenatal testing. Message left to review instructions given at last prenatal visit: FMR protocol and the recommendation to do it  2x a day. If she doesn't feel 10 movements in 2 hours, she needs to call the office and we will place standing orders for prenatal testing. Encouraged to call the office if she has any questions.

## 2017-08-24 LAB — BACTERIA SPEC AEROBE CULT: NORMAL

## 2017-08-26 ENCOUNTER — TELEPHONE (OUTPATIENT)
Dept: OBSTETRICS AND GYNECOLOGY | Facility: CLINIC | Age: 37
End: 2017-08-26

## 2017-08-26 NOTE — TELEPHONE ENCOUNTER
----- Message from Barb Harry sent at 8/25/2017  5:00 PM CDT -----  Contact: [phone]  Re:Consult-Patient has concerns about traveling while pregnant;please call to discuss.    Message left on voice mail with information regarding traveling at term. Kathy encouraged to call the office on Monday  if she had further questions or to call the midwife on call today if her questions needed an immediate answer.

## 2017-08-28 ENCOUNTER — ROUTINE PRENATAL (OUTPATIENT)
Dept: OBSTETRICS AND GYNECOLOGY | Facility: CLINIC | Age: 37
End: 2017-08-28
Attending: OBSTETRICS & GYNECOLOGY
Payer: COMMERCIAL

## 2017-08-28 ENCOUNTER — LAB VISIT (OUTPATIENT)
Dept: LAB | Facility: OTHER | Age: 37
End: 2017-08-28
Attending: ADVANCED PRACTICE MIDWIFE
Payer: COMMERCIAL

## 2017-08-28 VITALS — DIASTOLIC BLOOD PRESSURE: 66 MMHG | WEIGHT: 186.5 LBS | SYSTOLIC BLOOD PRESSURE: 110 MMHG | BODY MASS INDEX: 32.01 KG/M2

## 2017-08-28 DIAGNOSIS — O09.513 ELDERLY PRIMIGRAVIDA IN THIRD TRIMESTER: ICD-10-CM

## 2017-08-28 DIAGNOSIS — Z3A.37 37 WEEKS GESTATION OF PREGNANCY: ICD-10-CM

## 2017-08-28 DIAGNOSIS — O09.513 ELDERLY PRIMIGRAVIDA IN THIRD TRIMESTER: Primary | ICD-10-CM

## 2017-08-28 PROCEDURE — 99999 PR PBB SHADOW E&M-EST. PATIENT-LVL II: CPT | Mod: PBBFAC,,, | Performed by: ADVANCED PRACTICE MIDWIFE

## 2017-08-28 PROCEDURE — 0502F SUBSEQUENT PRENATAL CARE: CPT | Mod: S$GLB,,, | Performed by: ADVANCED PRACTICE MIDWIFE

## 2017-08-28 PROCEDURE — 86703 HIV-1/HIV-2 1 RESULT ANTBDY: CPT

## 2017-08-28 PROCEDURE — 86592 SYPHILIS TEST NON-TREP QUAL: CPT

## 2017-08-28 PROCEDURE — 36415 COLL VENOUS BLD VENIPUNCTURE: CPT

## 2017-08-28 NOTE — LETTER
August 28, 2017      [facility]  [address] [place] [address]  Phone: [phone]  Fax: [phone]       Patient: Kathy Vera   YOB: 1980  Date of Visit: 08/28/2017    To Whom It May Concern:    Liz Vera  was at [facility] on 8/28/2017. She may return to work on 8/29/2017 with no restrictions. If you have any questions or concerns, or if I can be of further assistance, please do not hesitate to contact me.    Sincerely,        Sherry nicholson CNM

## 2017-08-28 NOTE — LETTER
August 28, 2017      [facility]  [address] [place] [address]  Phone: [phone]  Fax: [phone]       Patient: Kathy Vera   YOB: 1980  Date of Visit: 08/28/2017    To Whom It May Concern:    Liz Vera  was at [facility] on 9/16/2017. She may return to work on 8/29/2017 with no restrictions. If you have any questions or concerns, or if I can be of further assistance, please do not hesitate to contact me.    Sincerely,        Sherry Cox CNM

## 2017-08-28 NOTE — LETTER
August 28, 2017    Kathy Vera  [address] [facility] [address]              [facility] [place]  [address] [place] [address]  Phone:[phone]  Fax: [phone]    To Whom It May Concern:    Ms. Vera is currently under our care for pregnancy.  Estimated Date of Delivery: 9/16/2017. Ms. Chavez desires to start her leave on 8/31/17. If you have any questions or concerns feel free to give our office a call.           Sincerely,        Sherry Cox CNM

## 2017-08-28 NOTE — PROGRESS NOTES
Was considering trip to [facility] but decided against it.  Has had good fetal movement since last visit. Is doing FMRs.  Thursday is hopefully her last day.  Preparing birth plan. Infant   Hospital stay expectations and when to come to the hospital reviewed.  3 T warning signs reviewed.  Using compression stocking.

## 2017-08-29 LAB
HIV 1+2 AB+HIV1 P24 AG SERPL QL IA: NEGATIVE
RPR SER QL: NORMAL

## 2017-08-31 ENCOUNTER — HOSPITAL ENCOUNTER (EMERGENCY)
Facility: OTHER | Age: 37
Discharge: HOME | End: 2017-08-31
Attending: OBSTETRICS & GYNECOLOGY
Payer: COMMERCIAL

## 2017-08-31 VITALS
TEMPERATURE: 98 F | SYSTOLIC BLOOD PRESSURE: 116 MMHG | OXYGEN SATURATION: 97 % | DIASTOLIC BLOOD PRESSURE: 72 MMHG | HEART RATE: 91 BPM

## 2017-08-31 DIAGNOSIS — N89.8 VAGINAL DISCHARGE: Primary | ICD-10-CM

## 2017-08-31 DIAGNOSIS — Z3A.37 37 WEEKS GESTATION OF PREGNANCY: ICD-10-CM

## 2017-08-31 PROCEDURE — 59025 FETAL NON-STRESS TEST: CPT

## 2017-08-31 PROCEDURE — 76815 OB US LIMITED FETUS(S): CPT | Mod: 26,,, | Performed by: OBSTETRICS & GYNECOLOGY

## 2017-08-31 PROCEDURE — 99284 EMERGENCY DEPT VISIT MOD MDM: CPT | Mod: 25

## 2017-08-31 NOTE — ED PROVIDER NOTES
Encounter Date: 2017       History     Chief Complaint   Patient presents with    Rupture of Membranes     Kathy Vera is a 36 y.o. female  at 37w5d with Estimated Date of Delivery: 17 L&D c/o increased vaginal discharge ? LOF. Started at 0300 this morning then noticed increased discharge throughout the day at work. Didn't call until about 1600 to speak with hcp. Reports normal + FM. Denies VB regular UCs. Accompanied by her  Elio  to L&D.     Pregnancy has been c/b:     Elderly primigravida in third trimester     Blood type B POS     Pre preg BMI 24     Elevated glucose     Carpal tunnel syndrome during pregnancy adams.     Vaginal discharge              Review of patient's allergies indicates:  No Known Allergies  Past Medical History:   Diagnosis Date    Blood type B POS 5/3/2017    Hyperlipidemia     never medicated     Past Surgical History:   Procedure Laterality Date    WISDOM TOOTH EXTRACTION       Family History   Problem Relation Age of Onset    Hypertension Mother     Breast cancer Neg Hx     Colon cancer Neg Hx     Ovarian cancer Neg Hx      Social History   Substance Use Topics    Smoking status: Never Smoker    Smokeless tobacco: Never Used    Alcohol use No      Comment: Pregnant      Review of Systems   Constitutional: Negative for activity change, fatigue and fever.   HENT: Negative for facial swelling.    Eyes: Negative for photophobia and visual disturbance.   Respiratory: Negative for chest tightness and shortness of breath.    Cardiovascular: Negative for chest pain.   Gastrointestinal: Negative for abdominal pain, constipation, diarrhea, nausea and vomiting.        Gravid   Genitourinary: Positive for vaginal discharge. Negative for difficulty urinating, vaginal bleeding and vaginal pain.   Skin: Negative for color change, pallor and rash.   Neurological: Negative for dizziness and light-headedness.   Psychiatric/Behavioral: The patient is nervous/anxious.         Physical Exam     Initial Vitals [08/31/17 1807]   BP Pulse Resp Temp SpO2   116/72 91 -- 98.1 °F (36.7 °C) 97 %      MAP       86.67         Physical Exam    Constitutional: She appears well-developed and well-nourished.   HENT:   Head: Normocephalic.   Eyes: EOM are normal.   Cardiovascular: Normal rate.   Pulmonary/Chest: Breath sounds normal.   Abdominal: Soft.   Gravid   Genitourinary: Uterus normal. Vaginal discharge found.   Musculoskeletal: Normal range of motion.   Neurological: She is alert and oriented to person, place, and time.   Psychiatric: She has a normal mood and affect. Her behavior is normal. Judgment and thought content normal.     OB LABOR EXAM:   Pre-Term Labor: No.   Membranes ruptured: No.   Method: Sterile vaginal exam per MD, Sterile speculum exam per MD and Other (see comments).   Vaginal Bleeding: none present.   Engagement: ballotable/floating.   Dilatation: 0.   Station: -4.         Comments: SSE per CNM: neg pooling, neg ferning, neg nitrazine  SVE: closed/thick/high  Bedside US by Dr Parson: LLOYD 7cm, two MVPs > 2cm       ED Course   Obtain Fetal nonstress test (NST)  Date/Time: 8/31/2017 6:51 PM  Performed by: LELAND PICKETT  Authorized by: LELAND PICKETT     Nonstress Test:     Variability:  6-25 BPM    Decelerations:  None    Accelerations:  15 bpm    Acoustic Stimulator: No      Baseline:  145    Uterine Irritability: No      Contractions:  Not present  Biophysical Profile:     LLOYD (if indicated) (cm):  7      Labs Reviewed - No data to display                            ED Course as of Sep 12 2052   Thu Aug 31, 2017   1755 Rule out ROM  Midwife patient  [LB]   1818 Midwife to evaluate patient  [LB]   1908 Limited US performed by myself  Impression: normal LLOYD  [LB]      ED Course User Index  [LB] Esthela Parson MD     Clinical Impression:   The encounter diagnosis was Vaginal discharge.     37yo G1 at 37w5d   Reactive NST   Vaginal discharge  -- Exam neg for  LOF/ROM and bedside US by Dr Parson revealed LLOYD of 7cm with two MVPs > 2cm    -- Discussed physiological leukorrhea of pregnancy and common increase in vaginal discharge. Encouraged her to call/seek evaluation for vaginal discharge with odor, burning or itching.   -- Encouraged her to call if she suspects LOF immediately (even if it's 3am). Made sure she had after hours # and knows to ask to speak with CNM on call.     Discharged home with [facility] instructions, labor precautions, and how/when to call  Encouraged to keep routine scheduled prenatal appts, call or present sooner prn    Dorothy Red CNM/NP  Certified Nurse Midwife/Nurse Practitioner  8/31/2017 6:55 PM        Limited US performed by myself. LLOYD: 7cm.  -Impression: normal LLOYD for gestational age                        Dorothy Red CNM  08/31/17 6716       Esthela Parson MD  09/12/17 4113

## 2017-08-31 NOTE — DISCHARGE INSTRUCTIONS
Keep previously scheduled clinic appointment  Return to L&D if you experience contractions every 2-5 minutes for two consecutive hours  Vaginal Bleeding  Decreased fetal movement  Leaking of fluid  Headache, dizziness or blurred vision  Temperature 100.4 or greater  Call the clinic ([phone]) during the day time or L&D ([phone]) after hours for any questions/concerns.  Drink 8-10 glasses of water a day

## 2017-09-02 ENCOUNTER — DOCUMENTATION ONLY (OUTPATIENT)
Dept: OBSTETRICS AND GYNECOLOGY | Facility: HOSPITAL | Age: 37
End: 2017-09-02

## 2017-09-02 ENCOUNTER — HOSPITAL ENCOUNTER (INPATIENT)
Facility: OTHER | Age: 37
LOS: 2 days | Discharge: HOME | End: 2017-09-04
Attending: OBSTETRICS & GYNECOLOGY | Admitting: OBSTETRICS & GYNECOLOGY
Payer: COMMERCIAL

## 2017-09-02 DIAGNOSIS — O42.911: Primary | ICD-10-CM

## 2017-09-02 DIAGNOSIS — Z3A.38 38 WEEKS GESTATION OF PREGNANCY: ICD-10-CM

## 2017-09-02 PROBLEM — O09.513 ELDERLY PRIMIGRAVIDA IN THIRD TRIMESTER: Status: RESOLVED | Noted: 2017-04-27 | Resolved: 2017-09-02

## 2017-09-02 PROBLEM — Z37.9 NORMAL LABOR: Status: RESOLVED | Noted: 2017-09-02 | Resolved: 2017-09-02

## 2017-09-02 PROBLEM — Z37.9 NORMAL LABOR: Status: ACTIVE | Noted: 2017-09-02

## 2017-09-02 PROCEDURE — 11000001 HC ACUTE MED/SURG PRIVATE ROOM

## 2017-09-02 PROCEDURE — 72100002 HC LABOR CARE, 1ST 8 HOURS

## 2017-09-02 PROCEDURE — 59025 FETAL NON-STRESS TEST: CPT

## 2017-09-02 PROCEDURE — 25000003 PHARM REV CODE 250: Performed by: ADVANCED PRACTICE MIDWIFE

## 2017-09-02 PROCEDURE — 63600175 PHARM REV CODE 636 W HCPCS

## 2017-09-02 PROCEDURE — 72200004 HC VAGINAL DELIVERY LEVEL I

## 2017-09-02 PROCEDURE — 0UQGXZZ REPAIR VAGINA, EXTERNAL APPROACH: ICD-10-PCS | Performed by: OBSTETRICS & GYNECOLOGY

## 2017-09-02 PROCEDURE — 59400 OBSTETRICAL CARE: CPT | Mod: ,,, | Performed by: ADVANCED PRACTICE MIDWIFE

## 2017-09-02 PROCEDURE — 99285 EMERGENCY DEPT VISIT HI MDM: CPT | Mod: 25

## 2017-09-02 RX ORDER — HYDROCORTISONE 25 MG/G
CREAM TOPICAL 3 TIMES DAILY PRN
Status: DISCONTINUED | OUTPATIENT
Start: 2017-09-02 | End: 2017-09-04 | Stop reason: HOSPADM

## 2017-09-02 RX ORDER — CARBOPROST TROMETHAMINE 250 UG/ML
INJECTION, SOLUTION INTRAMUSCULAR
Status: DISCONTINUED
Start: 2017-09-02 | End: 2017-09-02 | Stop reason: WASHOUT

## 2017-09-02 RX ORDER — HYDROCODONE BITARTRATE AND ACETAMINOPHEN 5; 325 MG/1; MG/1
1 TABLET ORAL EVERY 4 HOURS PRN
Status: DISCONTINUED | OUTPATIENT
Start: 2017-09-02 | End: 2017-09-04 | Stop reason: HOSPADM

## 2017-09-02 RX ORDER — ACETAMINOPHEN 325 MG/1
650 TABLET ORAL EVERY 6 HOURS PRN
Status: DISCONTINUED | OUTPATIENT
Start: 2017-09-02 | End: 2017-09-04 | Stop reason: HOSPADM

## 2017-09-02 RX ORDER — NAPROXEN 500 MG/1
500 TABLET ORAL EVERY 8 HOURS PRN
Status: DISCONTINUED | OUTPATIENT
Start: 2017-09-02 | End: 2017-09-04 | Stop reason: HOSPADM

## 2017-09-02 RX ORDER — LIDOCAINE HYDROCHLORIDE 10 MG/ML
10 INJECTION INFILTRATION; PERINEURAL ONCE AS NEEDED
Status: COMPLETED | OUTPATIENT
Start: 2017-09-02 | End: 2017-09-02

## 2017-09-02 RX ORDER — ONDANSETRON 8 MG/1
8 TABLET, ORALLY DISINTEGRATING ORAL EVERY 8 HOURS PRN
Status: DISCONTINUED | OUTPATIENT
Start: 2017-09-02 | End: 2017-09-02

## 2017-09-02 RX ORDER — METHYLERGONOVINE MALEATE 0.2 MG/ML
200 INJECTION INTRAVENOUS
Status: DISCONTINUED | OUTPATIENT
Start: 2017-09-02 | End: 2017-09-02

## 2017-09-02 RX ORDER — OXYTOCIN/RINGER'S LACTATE 20/1000 ML
41.65 PLASTIC BAG, INJECTION (ML) INTRAVENOUS
Status: ACTIVE | OUTPATIENT
Start: 2017-09-02 | End: 2017-09-03

## 2017-09-02 RX ORDER — ONDANSETRON 8 MG/1
8 TABLET, ORALLY DISINTEGRATING ORAL EVERY 8 HOURS PRN
Status: DISCONTINUED | OUTPATIENT
Start: 2017-09-02 | End: 2017-09-04 | Stop reason: HOSPADM

## 2017-09-02 RX ORDER — OXYTOCIN/RINGER'S LACTATE 20/1000 ML
41.65 PLASTIC BAG, INJECTION (ML) INTRAVENOUS ONCE AS NEEDED
Status: DISCONTINUED | OUTPATIENT
Start: 2017-09-02 | End: 2017-09-02

## 2017-09-02 RX ORDER — OXYTOCIN 10 [USP'U]/ML
INJECTION, SOLUTION INTRAMUSCULAR; INTRAVENOUS
Status: COMPLETED
Start: 2017-09-02 | End: 2017-09-02

## 2017-09-02 RX ORDER — MISOPROSTOL 200 UG/1
800 TABLET ORAL ONCE AS NEEDED
Status: DISCONTINUED | OUTPATIENT
Start: 2017-09-02 | End: 2017-09-02

## 2017-09-02 RX ORDER — AMOXICILLIN 250 MG
1 CAPSULE ORAL DAILY PRN
Status: DISCONTINUED | OUTPATIENT
Start: 2017-09-02 | End: 2017-09-04 | Stop reason: HOSPADM

## 2017-09-02 RX ADMIN — LIDOCAINE HYDROCHLORIDE 10 ML: 10 INJECTION, SOLUTION INFILTRATION; PERINEURAL at 07:09

## 2017-09-02 RX ADMIN — OXYTOCIN 10 UNITS: 10 INJECTION, SOLUTION INTRAMUSCULAR; INTRAVENOUS at 05:09

## 2017-09-02 NOTE — PROGRESS NOTES
"Phone call from patient. States she felt a popping sensation and had odorless,clear pink-tinged fluid from her vagina at 130 this morning. Has noticed "cramping that comes and goes." Has felt her baby move this morning but is not sure how much. Encouraged to report to the hospital immediately if she is not feeling her baby move. "ABC Protocol for Management of PROM at Term" read to Kathy. She is is to report back regarding FM. Again reviewed that if she can't stimulate the baby to move, she should report to [facility] for evaluation.  Her  is in [facility]. She will call him and have him fly home.  "

## 2017-09-02 NOTE — HOSPITAL COURSE
Presented to hospital 2017 at 0830 with SROM at 0130 that morning and onset of regular contractions at 0900. Her labor spontaneously progressed to a  at 1851. Her postpartum course has been uneventful with normal involution on PPD#1.

## 2017-09-02 NOTE — PROGRESS NOTES
Pt arrived c/o decreased FM and LOF. Stated heard a pop at 0100 and has been leaking clear to pink fluid and feeling cramping since. States fetal movement is decreased but that she felt movement on the way to the hospital. VSS, placed on fetal monitor, Sherry Lui CNM notified of pt arrival.

## 2017-09-02 NOTE — ASSESSMENT & PLAN NOTE
ROM with clear/pink tinged fluid at 0130 and onset of RUCs at 0900 this morning. PROM protocol given to patient and reviewed. Questions answered. Would like expectant management at this time.

## 2017-09-02 NOTE — PROGRESS NOTES
[facility]  Obstetrics  Labor Progress Note    Patient Name: Kathy Vera  MRN: [id number]  Admission Date: 2017  Hospital Length of Stay: 0 days  Attending Physician: No att. providers found  Primary Care Provider: Nelson Booker MD    Subjective:     Principal Problem:Premature rupture of membranes in first trimester    Hospital Course:  Presented to hospital  at 0830 with SROM at 0130 that morning and onset of regular contractions at 0900.     Interval History:  Kathy is a 36 y.o.  at 38w0d. She is working well with contractions and is starting to feel pressure with contractions.    Objective:     Vital Signs (Most Recent):  Temp: 97.3 °F (36.3 °C) (17 1523)  Pulse: 95 (17 1523)  Resp: 16 (17 1523)  BP: 130/70 (17 1523)  SpO2: 99 % (17 1523) Vital Signs (24h Range):  Temp:  [97.2 °F (36.2 °C)-97.3 °F (36.3 °C)] 97.3 °F (36.3 °C)  Pulse:  [] 95  Resp:  [16-18] 16  SpO2:  [98 %-99 %] 99 %  BP: (113-130)/(70-75) 130/70     Weight: 81.6 kg (180 lb)  Body mass index is 29.05 kg/m².    FHT: 145 with minimal variability and intermittent variabilities. Cat 2   TOCO:  Q 3-4 minutes/70%/-2    Cervical Exam:  Dilation:  8  Effacement:  80%  Station: -2  Presentation: Vertex     Significant Labs:  Lab Results   Component Value Date    GROUPTRH B POS 2017    HEPBSAG Negative 2017    STREPBCULT No Group B Streptococcus isolated 2017       I have personallly reviewed all pertinent lab results from the last 24 hours.    Physical Exam: Edema in lower extremities remains at +2 pitting edema.    Assessment/Plan:     36 y.o. female  at 38w0d for:    Elderly primigravida in third trimester    Had negative MaT21 and negative AFP testing. 32 week ultrasound was WNL.        * Premature rupture of membranes in first trimester    ROM with clear/pink tinged fluid at 0130 and onset of RUCs at 0900 this morning. PROM protocol given to patient and  reviewed. Questions answered. Would like expectant management at this time.              Sherry Lui CNM  Obstetrics  [facility]

## 2017-09-02 NOTE — H&P
"[facility]  Obstetrics  History & Physical    Patient Name: Kathy Vera  MRN: [id number]  Admission Date: 2017  Primary Care Provider: Nelson Booker MD    Subjective:     Principal Problem:Premature rupture of membranes in first trimester    History of Present Illness:  This 36 year old primigravida presents at 38 weeks gestation with report of a popping sensation with a gush of clear fluid from her vagina at 0130 this morning. Has noted moisture on her pad since then but states, "It is not soaked." Also reported decreased fetal movement and onset of "cramping that comes and goes." This pregnancy has been notable for AMA and Carpal Tunnel Syndrome    Obstetric HPI:  Patient reports Date/time of onset: this morning on admission to [facility] at 0900 contractions, reports FM but decreased from usual pattern of movement, No vaginal bleeding , Yes loss of fluid at 0130 this morning.    This pregnancy has been complicated by AMA and Carpal Tunnel Syndrome.    Obstetric History       T0      L0     SAB0   TAB0   Ectopic0   Multiple0   Live Births0       # Outcome Date GA Lbr Gamal/2nd Weight Sex Delivery Anes PTL Lv   1 Current               Obstetric Comments   Menarche 12     Past Medical History:   Diagnosis Date    Blood type B POS 5/3/2017    Hyperlipidemia     never medicated     Past Surgical History:   Procedure Laterality Date    WISDOM TOOTH EXTRACTION         PTA Medications   Medication Sig    PNV95/FERROUS FUMARATE/FA (PRENATAL MULTIVITAMINS ORAL) Take by mouth.       Review of patient's allergies indicates:  No Known Allergies     Family History     Problem Relation (Age of Onset)    Hypertension Mother        Social History Main Topics    Smoking status: Never Smoker    Smokeless tobacco: Never Used    Alcohol use No      Comment: Pregnant     Drug use: No    Sexual activity: Yes     Birth control/ protection: None      Comment:  : Elio     Review of Systems "   Gastrointestinal: Negative for vomiting.    Eyes: Negative for visual disturbance.   Respiratory: Negative.    Cardiovascular: Negative.  Negative for leg swelling.   Gastrointestinal: Negative.  Negative for nausea and vomiting.        Denies epigastric and RUQ abdominal pain.   Genitourinary: Positive for vaginal discharge. Negative for difficulty urinating, dysuria, genital sores and urgency.        Reports small amount of blood tinged fluid from vagina several times since 0130 this morning.   Neurological: Negative for headaches.   Objective:     Vital Signs (Most Recent):  Temp: 97.2 °F (36.2 °C) (09/02/17 1139)  Pulse: 93 (09/02/17 1139)  Resp: 16 (09/02/17 1139)  BP: 122/71 (09/02/17 1139)  SpO2: 99 % (09/02/17 1048) Vital Signs (24h Range):  Temp:  [97.2 °F (36.2 °C)] 97.2 °F (36.2 °C)  Pulse:  [] 93  Resp:  [16-18] 16  SpO2:  [98 %-99 %] 99 %  BP: (114-122)/(71-75) 122/71     Weight: 81.6 kg (180 lb)  Body mass index is 29.05 kg/m².    FHT: 155 with moderate variability, occasional acceleration and occasional variable deceleration.  TOCO: Q 3-4.5 minutes/ seconds/mild    Physical Exam:       Cardiovascular: Normal rate.    Split S2 noted which resolved when the patient was asked to hold her breath.                           Pulmonary/Chest: Breath sounds normal. No respiratory distress. She has no wheezes. She has no rales.   Abdominal: Soft. There is no tenderness.   Genitourinary: Vaginal discharge found.   Musculoskeletal: Normal range of motion. She exhibits no edema or tenderness.   Neurological: She is oriented to person, place, and time. She has normal reflexes.   Psychiatric: She has a normal mood and affect.     Cervix:  Dilation:  0  Effacement:  30%  Station: -3  Presentation: Vertex     Significant Labs:  Lab Results   Component Value Date    GROUPTRH B POS 01/23/2017    HEPBSAG Negative 01/23/2017    STREPBCULT No Group B Streptococcus isolated 08/21/2017       I have personallly  reviewed all pertinent lab results from the last 24 hours.    Assessment/Plan:     36 y.o. female  at 38w0d for:    Elderly primigravida in third trimester    Had negative MaT21 and negative AFP testing. 32 week ultrasound was WNL.        * Premature rupture of membranes in first trimester    ROM with clear/pink tinged fluid at 0130 and onset of RUCs at 0900 this morning. PROM protocol given to patient and reviewed. Questions answered. Would like expectant management at this time.            Sherry Lui CNM  Obstetrics  [facility]

## 2017-09-02 NOTE — ED PROVIDER NOTES
"Encounter Date: 9/2/2017       History     Chief Complaint   Patient presents with    Decreased Fetal Movement    Rupture of Membranes     This 36 year old primigravida presents at 38 weeks gestation with report of a popping sensation with a gush of clear fluid from her vagina at 0130 this morning. Has noted moisture on her pad since then but states, "It is not soaked." Also reported decreased fetal movement and onset of "cramping that comes and goes." This pregnancy has been notable for AMA and Carpal Tunnel Syndrome.          Review of patient's allergies indicates:  No Known Allergies  Past Medical History:   Diagnosis Date    Blood type B POS 5/3/2017    Hyperlipidemia     never medicated     Past Surgical History:   Procedure Laterality Date    WISDOM TOOTH EXTRACTION       Family History   Problem Relation Age of Onset    Hypertension Mother     Breast cancer Neg Hx     Colon cancer Neg Hx     Ovarian cancer Neg Hx      Social History   Substance Use Topics    Smoking status: Never Smoker    Smokeless tobacco: Never Used    Alcohol use No      Comment: Pregnant      Review of Systems   Eyes: Negative for visual disturbance.   Respiratory: Negative.    Cardiovascular: Negative.  Negative for leg swelling.   Gastrointestinal: Negative.  Negative for nausea and vomiting.        Denies epigastric and RUQ abdominal pain.   Genitourinary: Positive for vaginal discharge. Negative for difficulty urinating, dysuria, genital sores and urgency.        Reports small amount of blood tinged fluid from vagina several times since 0130 this morning.   Neurological: Negative for headaches.       Physical Exam     Initial Vitals   BP Pulse Resp Temp SpO2   09/02/17 0927 09/02/17 0927 09/02/17 0928 09/02/17 0928 09/02/17 0928   114/75 (!) 113 18 97.2 °F (36.2 °C) 98 %      MAP       09/02/17 0927       88         Physical Exam    Cardiovascular: Normal rate, regular rhythm and normal heart sounds.   No murmur " heard.  Pulmonary/Chest: Breath sounds normal. No respiratory distress. She has no wheezes. She has no rales.   Abdominal: Soft. There is no tenderness.   Genitourinary: Vaginal discharge found.   Musculoskeletal: Normal range of motion. She exhibits no edema or tenderness.   Neurological: She is oriented to person, place, and time. She has normal reflexes.   Psychiatric: She has a normal mood and affect.     OB LABOR EXAM:   Pre-Term Labor: No.     Method: Other (see comments).   Vaginal Bleeding: bloody show.     Dilatation: 0.   Station: -2.     Amniotic Fluid Color: pink.   Amniotic Fluid Amount: small.   Comments: Sterile vaginal exam per CNM. A small amount of pink tinged clear, odorless fluid noted in posterior fornix of vagina with PH paper color change to brown. Fluid on slide positive for ferning.     NST for one hour: 150 with minimal variability, accelerations with several variable decelerations in the past hour.  Interpretation: Category 2 tracing  ED Course   Procedures  Labs Reviewed - No data to display                            ED Course      Clinical Impression:   The encounter diagnosis was Premature rupture of membranes in first trimester, unspecified duration to onset of labor.                           Sherry Lui CNM  09/02/17 1052

## 2017-09-02 NOTE — HPI
"This 36 year old primigravida presents at 38 weeks gestation with report of a popping sensation with a gush of clear fluid from her vagina at 0130 this morning. Has noted moisture on her pad since then but states, "It is not soaked." Also reported decreased fetal movement and onset of "cramping that comes and goes." This pregnancy has been notable for AMA and Carpal Tunnel Syndrome  "

## 2017-09-02 NOTE — SUBJECTIVE & OBJECTIVE
Interval History:  Kathy is a 36 y.o.  at 38w0d. She is working well with contractions and is starting to feel pressure with contractions.    Objective:     Vital Signs (Most Recent):  Temp: 97.3 °F (36.3 °C) (17 1523)  Pulse: 95 (17 1523)  Resp: 16 (17 1523)  BP: 130/70 (17 1523)  SpO2: 99 % (17 1523) Vital Signs (24h Range):  Temp:  [97.2 °F (36.2 °C)-97.3 °F (36.3 °C)] 97.3 °F (36.3 °C)  Pulse:  [] 95  Resp:  [16-18] 16  SpO2:  [98 %-99 %] 99 %  BP: (113-130)/(70-75) 130/70     Weight: 81.6 kg (180 lb)  Body mass index is 29.05 kg/m².    FHT: 145 with minimal variability and intermittent variabilities. Cat 2   TOCO:  Q 3-4 minutes/70%/-2    Cervical Exam:  Dilation:  8  Effacement:  80%  Station: -2  Presentation: Vertex     Significant Labs:  Lab Results   Component Value Date    GROUPTRH B POS 2017    HEPBSAG Negative 2017    STREPBCULT No Group B Streptococcus isolated 2017       I have personallly reviewed all pertinent lab results from the last 24 hours.    Physical Exam: Edema in lower extremities remains at +2 pitting edema.

## 2017-09-02 NOTE — SUBJECTIVE & OBJECTIVE
Obstetric HPI:  Patient reports Date/time of onset: this morning on admission to [facility] at 0900 contractions, reports FM but decreased from usual pattern of movement, No vaginal bleeding , Yes loss of fluid at 0130 this morning.    This pregnancy has been complicated by AMA and Carpal Tunnel Syndrome.    Obstetric History       T0      L0     SAB0   TAB0   Ectopic0   Multiple0   Live Births0       # Outcome Date GA Lbr Gamal/2nd Weight Sex Delivery Anes PTL Lv   1 Current               Obstetric Comments   Menarche 12     Past Medical History:   Diagnosis Date    Blood type B POS 5/3/2017    Hyperlipidemia     never medicated     Past Surgical History:   Procedure Laterality Date    WISDOM TOOTH EXTRACTION         PTA Medications   Medication Sig    PNV95/FERROUS FUMARATE/FA (PRENATAL MULTIVITAMINS ORAL) Take by mouth.       Review of patient's allergies indicates:  No Known Allergies     Family History     Problem Relation (Age of Onset)    Hypertension Mother        Social History Main Topics    Smoking status: Never Smoker    Smokeless tobacco: Never Used    Alcohol use No      Comment: Pregnant     Drug use: No    Sexual activity: Yes     Birth control/ protection: None      Comment:  : Elio     Review of Systems   Gastrointestinal: Negative for vomiting.    Eyes: Negative for visual disturbance.   Respiratory: Negative.    Cardiovascular: Negative.  Negative for leg swelling.   Gastrointestinal: Negative.  Negative for nausea and vomiting.        Denies epigastric and RUQ abdominal pain.   Genitourinary: Positive for vaginal discharge. Negative for difficulty urinating, dysuria, genital sores and urgency.        Reports small amount of blood tinged fluid from vagina several times since 0130 this morning.   Neurological: Negative for headaches.   Objective:     Vital Signs (Most Recent):  Temp: 97.2 °F (36.2 °C) (17 1139)  Pulse: 93 (17 1139)  Resp: 16 (17 1139)  BP:  122/71 (09/02/17 1139)  SpO2: 99 % (09/02/17 1048) Vital Signs (24h Range):  Temp:  [97.2 °F (36.2 °C)] 97.2 °F (36.2 °C)  Pulse:  [] 93  Resp:  [16-18] 16  SpO2:  [98 %-99 %] 99 %  BP: (114-122)/(71-75) 122/71     Weight: 81.6 kg (180 lb)  Body mass index is 29.05 kg/m².    FHT: 155 with moderate variability, occasional acceleration and occasional variable deceleration.  TOCO: Q 3-4.5 minutes/ seconds/mild    Physical Exam:       Cardiovascular: Normal rate.    Split S2 noted which resolved when the patient was asked to hold her breath.                           Pulmonary/Chest: Breath sounds normal. No respiratory distress. She has no wheezes. She has no rales.   Abdominal: Soft. There is no tenderness.   Genitourinary: Vaginal discharge found.   Musculoskeletal: Normal range of motion. She exhibits no edema or tenderness.   Neurological: She is oriented to person, place, and time. She has normal reflexes.   Psychiatric: She has a normal mood and affect.     Cervix:  Dilation:  0  Effacement:  30%  Station: -3  Presentation: Vertex     Significant Labs:  Lab Results   Component Value Date    GROUPTRH B POS 01/23/2017    HEPBSAG Negative 01/23/2017    STREPBCULT No Group B Streptococcus isolated 08/21/2017       I have personallly reviewed all pertinent lab results from the last 24 hours.

## 2017-09-03 PROCEDURE — 11000001 HC ACUTE MED/SURG PRIVATE ROOM

## 2017-09-03 PROCEDURE — 25000003 PHARM REV CODE 250: Performed by: ADVANCED PRACTICE MIDWIFE

## 2017-09-03 RX ADMIN — NAPROXEN 500 MG: 500 TABLET ORAL at 01:09

## 2017-09-03 NOTE — ASSESSMENT & PLAN NOTE
at 1851 2017. Small vaginal laceration at introitus repaired with 3-0 chromic.  PPD#1: normal pp involution.

## 2017-09-03 NOTE — PLAN OF CARE
Problem: Patient Care Overview  Goal: Plan of Care Review  Outcome: Ongoing (interventions implemented as appropriate)  VSS, no acute distress or discomfort overnight, ambulating, and voiding. Breastfeeding infant. Will continue to monitor.

## 2017-09-03 NOTE — L&D DELIVERY NOTE
Delivery Information for  Zeenat Vera    Birth information:  YOB: 2018   Time of birth: 6:51 PM   Sex: female   Head Delivery Date/Time: 2017  6:51 PM   Delivery type: Vaginal, Spontaneous Delivery   Gestational Age: 38w0d    Delivery Providers    Delivering clinician:  Sherry Lui CNM   Other personnel:   Provider Role   Sharayah A. Clouston, RN Registered Nurse   Roya Smith RN Charge Nurse                   Benezett Assessment    Living status:  Living  Apgars:     1 Minute:   5 Minute:   10 Minute:   15 Minute:   20 Minute:     Skin Color:   0  1       Heart Rate:   2  2       Reflex Irritability:   2  2       Muscle Tone:   2  2       Respiratory Effort:   2  2       Total:   8  9                      Assisted Delivery Details:    Forceps attempted?:  No  Vacuum extractor attempted?:  No         Shoulder Dystocia    Shoulder dystocia present?:  No           Presentation and Position    Presentation:   Vertex   Position:                   Interventions/Resuscitation    Method:  Tactile Stimulation       Cord    Vessels:  3 vessels  Complications:  Nuchal  Nuchal Intervention:  reduced  Nuchal Cord Description:  loose nuchal cord  Number of Loops:  1  Delayed Cord Clamping?:  Yes  Cord Clamped Date/Time:  2017  6:53 PM  Cord Blood Disposition:  Sent with Baby  Gases Sent?:  No       Placenta    Date and time:  2017  7:00 PM  Removal:  Spontaneous  Appearance:  Intact  Placenta disposition:  discarded           Labor Events:       labor: No     Labor Onset Date/Time: 2017 09:00     Dilation Complete Date/Time:         Start Pushing Date/Time: 2017 17:39     Rupture Date/Time:              Rupture type:           Fluid Amount:        Fluid Color:        Fluid Odor:        Membrane Status (PeriCalm):        Rupture Date/Time (PeriCalm):        Fluid Amount (PeriCalm):        Fluid Color (PeriCalm):         steroids: None     Antibiotics  given for GBS: No     Induction: none     Indications for induction:        Augmentation:       Indications for augmentation:       Labor complications: None     Additional complications:          Cervical ripening:                     Delivery:      Episiotomy: None     Indication for Episiotomy:       Perineal Lacerations: None Repaired:      Periurethral Laceration: none Repaired:     Labial Laceration: none Repaired:     Sulcus Laceration: none Repaired:     Vaginal Laceration: Yes Repaired: Yes   Cervical Laceration: No Repaired:     Repair suture:       Repair # of packets: 1     Vaginal delivery QBL (mL): 318      QBL (mL): 0     Combined Blood Loss (mL): 318     Vaginal Sweep Performed: Yes     Surgicount Correct:         Other providers:       Anesthesia    Method:  Local          Details (if applicable):  Trial of Labor      Categorization:      Priority:     Indications for :     Incision Type:       Additional  information:  Forceps:    Vacuum:    Breech:    Observed anomalies    Other (Comments):

## 2017-09-03 NOTE — PROGRESS NOTES
[facility]  Obstetrics  Postpartum Progress Note    Patient Name: Kathy Vera  MRN: [id number]  Admission Date: 2017  Hospital Length of Stay: 1 days  Attending Physician: Yisel Simons MD  Primary Care Provider: Nelson Booker MD    Subjective:     Principal Problem: (normal spontaneous vaginal delivery)    Hospital course: Presented to hospital 2017 at 0830 with SROM at 0130 that morning and onset of regular contractions at 0900. Her labor spontaneously progressed to a  at 1851. Her postpartum course has been uneventful with normal involution on PPD#1.    Interval History:     She is doing well this morning. She is tolerating a regular diet without nausea or vomiting. She is voiding spontaneously. She is ambulating. She has passed flatus, and has not a BM. Vaginal bleeding is moderate. She denies fever or chills. Abdominal pain is moderate and controlled with oral medications. She is breastfeeding but doesn't feel completely comfortable yet. She is looking forward to working with a lactation consultant today.    Objective:     Vital Signs (Most Recent):  Temp: 98.3 °F (36.8 °C) (17 0835)  Pulse: 83 (17 0835)  Resp: 18 (17 0835)  BP: 98/60 (17 0835)  SpO2: 98 % (17 2245) Vital Signs (24h Range):  Temp:  [97.3 °F (36.3 °C)-98.3 °F (36.8 °C)] 98.3 °F (36.8 °C)  Pulse:  [] 83  Resp:  [16-20] 18  SpO2:  [77 %-99 %] 98 %  BP: ()/(60-72) 98/60     Weight: 81.6 kg (180 lb)  Body mass index is 29.05 kg/m².      Intake/Output Summary (Last 24 hours) at 17 1310  Last data filed at 17 2139   Gross per 24 hour   Intake                0 ml   Output              618 ml   Net             -618 ml       Significant Labs:  Lab Results   Component Value Date    GROUPTRH B POS 2017    HEPBSAG Negative 2017    STREPBCULT No Group B Streptococcus isolated 2017     No results for input(s): HGB, HCT in the last 48 hours.    I  have personallly reviewed all pertinent lab results from the last 24 hours.    Physical Exam:       Cardiovascular: Normal rate and regular rhythm.     Pulmonary/Chest: Effort normal. No respiratory distress.        Abdominal: Soft. There is no tenderness.   Fundus firm at U-1.     Genitourinary:   Genitourinary Comments: Moderate amount rubra lochia.  Vaginal repair intact.           Musculoskeletal: Normal range of motion. She exhibits no edema or tenderness.       Neurological: She has normal reflexes. She displays normal reflexes. She exhibits normal muscle tone.     Psychiatric: She has a normal mood and affect. Her behavior is normal.       Assessment/Plan:     36 y.o. female  for:     premature rupture of membranes in third trimester    ROM with clear/pink tinged fluid at 0130 . PROM protocol given to patient and reviewed. Labor progressed spontaneously. ROM x approximately 17.5 hours before delivery without any signs or symptoms of maternal or  infection.        *  (normal spontaneous vaginal delivery)     at 1851 2017. Small vaginal laceration at introitus repaired with 3-0 chromic.  PPD#1: normal pp involution.            Disposition: As patient meets milestones, will plan to discharge tomorrow.    Sherry Lui CNM  Obstetrics  [facility]

## 2017-09-03 NOTE — PROGRESS NOTES
Patient is spontaneously pushing and found to be completely dilated with vertex at +1 station. Two variable decelerations down to 60bpm/40-70 seconds noted with pushing effort. IV saline lock placed. Dr. Simons notified. Position changed.

## 2017-09-03 NOTE — ASSESSMENT & PLAN NOTE
ROM with clear/pink tinged fluid at 0130 . PROM protocol given to patient and reviewed. Labor progressed spontaneously. ROM x approximately 17.5 hours before delivery without any signs or symptoms of maternal or  infection.

## 2017-09-03 NOTE — SUBJECTIVE & OBJECTIVE
Hospital course: Presented to hospital 2017 at 0830 with SROM at 0130 that morning and onset of regular contractions at 0900. Her labor spontaneously progressed to a  at 1851. Her postpartum course has been uneventful with normal involution on PPD#1.    Interval History:     She is doing well this morning. She is tolerating a regular diet without nausea or vomiting. She is voiding spontaneously. She is ambulating. She has passed flatus, and has not a BM. Vaginal bleeding is moderate. She denies fever or chills. Abdominal pain is moderate and controlled with oral medications. She is breastfeeding but doesn't feel completely comfortable yet. She is looking forward to working with a lactation consultant today.    Objective:     Vital Signs (Most Recent):  Temp: 98.3 °F (36.8 °C) (17 0835)  Pulse: 83 (17 0835)  Resp: 18 (17 0835)  BP: 98/60 (17 0835)  SpO2: 98 % (17 2245) Vital Signs (24h Range):  Temp:  [97.3 °F (36.3 °C)-98.3 °F (36.8 °C)] 98.3 °F (36.8 °C)  Pulse:  [] 83  Resp:  [16-20] 18  SpO2:  [77 %-99 %] 98 %  BP: ()/(60-72) 98/60     Weight: 81.6 kg (180 lb)  Body mass index is 29.05 kg/m².      Intake/Output Summary (Last 24 hours) at 17 1310  Last data filed at 17 2139   Gross per 24 hour   Intake                0 ml   Output              618 ml   Net             -618 ml       Significant Labs:  Lab Results   Component Value Date    GROUPTRH B POS 2017    HEPBSAG Negative 2017    STREPBCULT No Group B Streptococcus isolated 2017     No results for input(s): HGB, HCT in the last 48 hours.    I have personallly reviewed all pertinent lab results from the last 24 hours.    Physical Exam:       Cardiovascular: Normal rate and regular rhythm.     Pulmonary/Chest: Effort normal. No respiratory distress.        Abdominal: Soft. There is no tenderness.   Fundus firm at U-1.     Genitourinary:   Genitourinary Comments: Moderate amount  rubra lochia.  Vaginal repair intact.           Musculoskeletal: Normal range of motion. She exhibits no edema or tenderness.       Neurological: She has normal reflexes. She displays normal reflexes. She exhibits normal muscle tone.     Psychiatric: She has a normal mood and affect. Her behavior is normal.

## 2017-09-04 VITALS
DIASTOLIC BLOOD PRESSURE: 71 MMHG | HEART RATE: 83 BPM | HEIGHT: 66 IN | WEIGHT: 180 LBS | OXYGEN SATURATION: 96 % | RESPIRATION RATE: 16 BRPM | BODY MASS INDEX: 28.93 KG/M2 | TEMPERATURE: 98 F | SYSTOLIC BLOOD PRESSURE: 103 MMHG

## 2017-09-04 NOTE — LACTATION NOTE
"   09/03/17 1635   Maternal Infant Assessment   Breast Shape Bilateral:;round   Breast Density Bilateral:;soft   Areola Right:;elastic   Nipple(s) Bilateral:;everted   Nipple Symptoms tender   Infant Assessment   Sucking Reflex present   Rooting Reflex present   Swallow Reflex present   LATCH Score   Latch 1-->repeated attempts, holds nipple in mouth, stimulate to suck   Audible Swallowing 1-->a few with stimulation   Type Of Nipple 2-->everted (after stimulation)   Comfort (Breast/Nipple) 2-->soft/nontender   Hold (Positioning) 1-->minimal assist, teach one side: mother does other, staff holds   Score (less than 7 for 2/more consecutive times, consult Lactation Consultant) 7   Maternal Infant Feeding   Maternal Emotional State assist needed   Infant Positioning clutch/"football"   Time Spent (min) 15-30 min   Latch Assistance yes   Breastfeeding History   Currently Breastfeeding yes   Feeding Infant   Effective Latch During Feeding yes   Audible Swallow yes   Suck/Swallow Coordination present   Skin-to-Skin Contact During Feeding yes   Lactation Referrals   Lactation Consult Breastfeeding assessment;Initial assessment   Lactation Interventions   Breastfeeding Assistance infant latch-on verified;infant suck/swallow verified;assisted with positioning;feeding cue recognition promoted;support offered   Maternal Breastfeeding Support lactation counseling provided   Latch Promotion positioning assisted;infant moved to breast;suck stimulated with colostrum drop   assisted pt with position and latch. Baby latched to right breast in football hold. Stimulation needed to keep baby actively nursing. Baby fussy and would not latch to  Left breast. Baby held skin to skin and fell asleep. Breastfeeding ed given. Questions answered. Strongly encouraged skin to skin.  "

## 2017-09-04 NOTE — LACTATION NOTE
Discharge breastfeeding education given. Questions answered. Pt has lactation contact number. Encouraged pt to call for breastfeeding assessment prior to discharge.

## 2017-09-04 NOTE — DISCHARGE INSTRUCTIONS
Breastfeeding Discharge Instructions       Feed the baby at the earliest sign of hunger or comfort  o Hands to mouth, sucking motions  o Rooting or searching for something to suck on  o Dont wait for crying - it is a sign of distress     The feedings may be 8-12 times per 24hrs and will not follow a schedule   Avoid pacifiers and bottles for the first 4 weeks   Alternate the breast you start the feeding with, or start with the breast that feels the fullest   Switch breasts when the baby takes himself off the breast or falls asleep   Keep offering breasts until the baby looks full, no longer gives hunger signs, and stays asleep when placed on his back in the crib   If the baby is sleepy and wont wake for a feeding, put the baby skin-to-skin dressed in a diaper against the mothers bare chest   Sleep near your baby   The baby should be positioned and latched on to the breast correctly  o Chest-to-chest, chin in the breast  o Babys lips are flipped outward  o Babys mouth is stretched open wide like a shout  o Babys sucking should feel like tugging to the mother  - The baby should be drinking at the breast:  o You should hear swallowing or gulping throughout the feeding  o You should see milk on the babys lips when he comes off the breast  o Your breasts should be softer when the baby is finished feeding  o The baby should look relaxed at the end of feedings  o After the 4th day and your milk is in:  o The babys poop should turn bright yellow and be loose, watery, and seedy  o The baby should have at least 3-4 poops the size of the palm of your hand per day  o The baby should have at least 5-6 wet diapers per day  o The urine should be light yellow in color  You should drink when you are thirsty and eat a healthy diet when you are    hungry.     Take naps to get the rest you need.   Take medications and/or drink alcohol only with permission of your obstetrician    or the babys pediatrician.  You can  also call the Infant Risk Center,   ([phone]), Monday-Friday, 8am-5pm Central time, to get the most   up-to-date evidence-based information on the use of medications during   pregnancy and breastfeeding.      The baby should be examined by a pediatrician at 3-5 days of age.  Once your   milk comes in, the baby should be gaining at least ½ - 1oz each day and should be back to birthweight no later than 10-14 days of age.          Community Resources    [facility] Breastfeeding Warmline: [phone]   Local [facility] clinics: provide incentives and breastpumps to eligible mothers  La Leche [employer] (LLLI):  mother-to-mother support group website        www.[employer]  Local La Leche League mother-to-mother support groups:        www.[employer]        La Leche League [facility]   Dr. Oral Davenport website for latch videos and general information:        www.breastfeedinginc.ca  Infant Risk Center is a call center that provides information about the safety of taking medications while breastfeeding.  Call 1-738.432.1683, M-F, 8am-5pm, [place].  International Lactation Consultant Association provides resources for assistance:        www.ilca.org  [facility] Breastfeeding Coalition provides informationand resources for parents  and the community    http://Nemours Foundationastfeeding.org     Argelia Phillips is a mom-to-mom support group:                             www.[employer]agle [employer] NetworkstroyZAOZAO.com//breastfeedng-support/  [employer] for Healthy Babies:  0-457-229-BABY(2705)  [employer]: a breastfeeding support group for women of color, [phone]

## 2017-09-04 NOTE — PROGRESS NOTES
[facility]  Vaginal Delivery Progress Note  Obstetrics    SUBJECTIVE:     Kathy Vera is a 36 y.o. female PPD #2 status post Spontaneous vaginal delivery at 38w0d in a pregnancy complicated by AMA. Patient is doing well this morning. She denies nausea, vomiting, fever or chills. Patient reports mild abdominal pain that is well relieved by oral pain medications. Lochia is mild to moderate  and decreasing. Patient is voiding without difficulty and ambulating with no difficulty. She has passed flatus and has not had a BM. Patient does plan to breast feed. ? for contraception. She N/Adesires circumcision.    OBJECTIVE:     Vital Signs Ranges:  Temp:  [98 °F (36.7 °C)-98.6 °F (37 °C)] 98.1 °F (36.7 °C)  Pulse:  [] 83  Resp:  [16-18] 16  SpO2:  [96 %-97 %] 96 %  BP: (103-115)/(53-71) 103/71    I/O (Last 24H):  No intake or output data in the 24 hours ending 17 1007    Physical Exam:  General:    alert, appears stated age and cooperative   Lungs:  Normal effort   Heart:  regular rate and rhythm, S1, S2 normal, no murmur, click, rub or gallop and normal apical impulse   Abdomen:  normal findings: soft, non-tender   Uterine Size:  firm located 3 FB below the umbilicus.   Incision:  N/A   Extremities:   peripheral pulses normal, no pedal edema, no clubbing or cyanosis, no pedal edema noted     Lab Review:   [unfilled]    ASSESSMENT:     Assessment:  Active Hospital Problems    Diagnosis    * (normal spontaneous vaginal delivery)     premature rupture of membranes in third trimester      PLAN:     Plan:  1. Postpartum care:   - Patient doing well. Continue routine management and advances.   - Continue PO pain meds. Pain well controlled.   - Post : H/h .    - Encourage ambulation   - Circumcision N/A   - Contraception?   - Lactation breastfeeding    2. PP Day 2 Stable   breastfeeding    Disposition: As patient meets milestones, will plan to discharge home today.

## 2017-09-06 NOTE — DISCHARGE SUMMARY
[facility]  Delivery Discharge Summary  Obstetrics    Admit Date: 2017    Discharge Date and Time: 2017  2:50 PM    Primary OB Clinician: SHAR Babcock    Attending Physician: Gianna att. providers found     Discharge Provider: Gayathri Mcgrath    Reason for Admission:     Estimated Date of Delivery: 17     Conditions: Advanced Maternal Age    Procedures Performed: * No surgery found *    Hospital Course (synopsis of major diagnoses, care, treatment, and services provided during the course of the hospital stay):    Kathy Vera is a 36 y.o. now , PPD #2 who was admitted on 2017   for normal spontaneous vaginal delivery. On initial assessment, vital signs were stable and physical exam was normal. Infant was in cephalic presentation. Patient was subsequently admitted to labor and delivery unit with signed consents. Labor course was managed with.  Patient delivered a single viable  female. Pt was in stable condition post-delivery and was transferred to the Mother-Baby Unit. Her postpartum course was uncomplicated. On discharge day, patient's pain is controlled with oral pain medications. Patient is tolerating PO without nausea or vomiting, ambulating, voiding. She denies SOB and CP. Denies any HA, vision changes, F/C, LE swelling. Denies any breast pain/soreness.     Delivery:    Episiotomy: None   Lacerations: None   Repair suture:     Repair # of packets: 1   Blood loss (ml): 318     Birth information:  YOB: 2018   Time of birth: 6:51 PM   Sex: female   Delivery type: Vaginal, Spontaneous Delivery   Gestational Age: 38w0d    Delivery Clinician:      Other providers:       Additional  information:  Forceps:    Vacuum:    Breech:    Observed anomalies      Living?:           APGARS  One minute Five minutes Ten minutes   Skin color:         Heart rate:         Grimace:         Muscle tone:         Breathing:         Totals: 8  9         Placenta: Delivered:       appearance    Tubal Ligation: n/a    Feeding Method: breast    Rh Immune Globulin Given: no    Rubella Vaccine Given: no    Tdap Vaccine Given: no    Consults: none    Significant Diagnostic Studies: Labs: All labs within the past 24 hours have been reviewed    Final Diagnoses:   Principal Problem:  (normal spontaneous vaginal delivery)   Secondary Diagnoses:   Active Hospital Problems    Diagnosis  POA    * (normal spontaneous vaginal delivery) [O80]  Not Applicable     premature rupture of membranes in third trimester [O42.913]  Yes      Resolved Hospital Problems    Diagnosis Date Resolved POA    Normal labor [O80, Z37.9] 2017 Not Applicable     Spontaneous labor onset at 0900 this morning. Now in active labor with good progress.      Elderly primigravida in third trimester [O09.513] 2017 Yes       Discharged Condition: good    Disposition: Home or Self Care    Follow Up/Patient Instructions:     Medications:  Reconciled Home Medications:   Discharge Medication List as of 2017 10:20 AM      CONTINUE these medications which have NOT CHANGED    Details   PNV95/FERROUS FUMARATE/FA (PRENATAL MULTIVITAMINS ORAL) Take by mouth., Until Discontinued, Historical Med             Discharge Procedure Orders  Diet general     Activity as tolerated     Call MD for:  increased confusion or weakness     Call MD for:  persistent dizziness, light-headedness, or visual disturbances     Call MD for:  worsening rash     Call MD for:  severe persistent headache     Call MD for:  difficulty breathing or increased cough     Call MD for:  redness, tenderness, or signs of infection (pain, swelling, redness, odor or green/yellow discharge around incision site)     Call MD for:  severe uncontrolled pain     Call MD for:  persistent nausea and vomiting or diarrhea     Call MD for:  temperature >100.4       Follow-up Information     Follow up In 4 weeks.    Why:  PP Check

## 2017-09-07 ENCOUNTER — TELEPHONE (OUTPATIENT)
Dept: OBSTETRICS AND GYNECOLOGY | Facility: CLINIC | Age: 37
End: 2017-09-07

## 2017-09-07 NOTE — TELEPHONE ENCOUNTER
----- Message from Barb Harry sent at 9/7/2017  3:09 PM CDT -----  Contact: [phone]  Re:Consult- Patient just gave birth;has some personal questions;please call to discuss      Thank s    ____________________  Returned call  Had some basic questions about stitches, perineal care, and healing  Reviewed postpartum care and precautions/warning signs      Dorothy Red CNM/NP  Certified Nurse Midwife/Nurse Practitioner  9/7/2017 3:17 PM

## 2017-09-08 ENCOUNTER — TELEPHONE (OUTPATIENT)
Dept: LACTATION | Facility: CLINIC | Age: 37
End: 2017-09-08

## 2017-09-08 NOTE — TELEPHONE ENCOUNTER
Lactation note:  The patient was called back after leaving a message on the warmline. The patient was concerned about her milk supply because her breasts were softer. She has been talking with lactation for a few days. She was suggested to start pumping due to sore, cracked nipples/problems with latching. Patient has been trying to pump 8 times daily but is tired. She gets between 1-2 oz each pump. Discussed that patient's breasts may feel softer since she is emptying regularly now. Encouraged pumping 8 or more times daily using hands on pumping and warm compresses until breasts empty. Suggested patient sleep 4-5 hours at night but pump more often during the day. Discussed possible need for rental pump if patient continues pumping only and need to come for outpatient consultation if patient wants latch assistance. Patient has LC phone number to call back as needed.

## 2017-09-14 ENCOUNTER — TELEPHONE (OUTPATIENT)
Dept: OBSTETRICS AND GYNECOLOGY | Facility: CLINIC | Age: 37
End: 2017-09-14

## 2017-09-15 NOTE — TELEPHONE ENCOUNTER
Returned phone call from patient and left message on her voice mail. She was instructed to call L&D to ask questions of the RNs or myself or to call the office again in the morning.

## 2017-09-18 ENCOUNTER — LACTATION CONSULT (OUTPATIENT)
Dept: LACTATION | Facility: CLINIC | Age: 37
End: 2017-09-18
Payer: COMMERCIAL

## 2017-09-18 DIAGNOSIS — Z91.89 AT RISK FOR INEFFECTIVE BREASTFEEDING: Primary | ICD-10-CM

## 2017-09-18 PROCEDURE — 99199 UNLISTED SPECIAL SVC PX/RPRT: CPT | Mod: S$GLB,,,

## 2017-09-18 NOTE — PROGRESS NOTES
Lactation Out-Patient Consultation    Reason for consultation - feeding evaluation; pre/post weight    Babys  [birth year]  Hospital of birth- [facility]  Maternal history:  G  1  P 1  RA 1  Weeks gestation at birth - 38  Delivery type:    Vaginal     Complications with pregnancy No  Labor/birth complications No   Antibiotics received No  Birth weight 7-13.6  Maternal history of: hyperlipidemia; elevated glucose; carpal tunnel  Anemia unknown   High blood pressure unknown  Thyroid disorder  No  Infertility/Pregnancy losses  No  Hx of Obesity  No   Hx of breast surgery No  Hx of allergies on either side of family unknown  Hx of tobacco use     No  Symptoms of retained placenta No  Previous breastfeeding experience No  Known health problems of baby No  Other      Breastfeeding hx during hospital stay:  First feeding at the breast Yes   Initial feeding delayed  No   Feeding hx frequency/duration- 7-8  Milk came in yes  Breast engorgement mild  Sore nipples- yes  Latch ability - difficulty with deep latch   hypoglycemia No  Supplemented in hospital No  Pacifier use No  Discharge date/ weight 7-7.8 (-4.6%)  Other  Breastfeeding hx since arrival home:  Pediatrician visit/weight checks   Date- a few days after discharge  Weight 7-10 nude  Feeding frequency/duration within first week mother stopped breastfeeding for 3-4 days due to sore nipples and did not pump   Use 1 or more breasts at each feeding - varies  Do you see/hear long, rhythmic sucks  Yes at beginning of feeding  Babys behavior at breast - sleepy then wakes within 15 min- one hour  Do you awaken baby for most feedings No  Longest stretch of sleep- 1-2 hours  Does the baby act content and sleep at the end of feedings  No  Is the baby able to latch onto both breasts without difficulty  Yes now  Breast engorgement comfort measures being used Not engorged  Nipple pain compared to pain during hospitalization :     Better   Nipple comfort measures being  "used  Not being used any longer  Diaper counts last 24hrs:   4urine diapers  4stool diapers  stool appearance - yellow soft  Is baby being supplemented Yes - at night mother states she has been exhausted so she has given formula 2 oz at each feeding; baby averages 2-3 bottles/24 hours for a total of 6 oz  Using a pacifier No  Are you using a breast pump Yes - sporadically; started the first week when she stopped breastfeeding 3-4 times/24 hours after calling warm line for guidance; hasn't pumped much in past several days. States she nurses baby throughout the day frequently and at night she takes a break and FOB feeds the baby formula and mother sleeps for 3-4 hours and does not pump  Breastfeeding aids in use - none   Care recommended by physician - states she hasn't really discussed breastfeeding or feedings with Dr Lane, the baby's pediatrician  Support /assistance at home - patient's  "Elio"  Other    Breastfeeding goals- breastfeed baby exclusively and to make sure the baby is getting enough milk    Feeding assessment :  Pre-feeding naked weight - 3700 grams 8-2.5 oz   Pre-feeding weight - 8-3.2 oz 3720 grams wet diaper changed prior to weight  Babys appearance - alert; well nourished   Babys oral anatomy appears to be WNL; mother states baby had hiccups in utero  Breast assessment - breasts are round and soft  Nipple/areolae assessment- everted without redness or skin breakdown  Maternal ability to position and latch baby onto breasts - independent; minor assistance needed to achieve deep latch  Asymmetric latch achieved Yes    Nutritive sucks observed Yes- for first few minutes with initial letdown to R breast; after first few minutes infrequent swallows noted and sucks became non nutritive; L breast baby nursed more nutritively and more audible swallows  . Mother instructed to use breast compression throughout the feeding to increase milk transfer. Baby also nursed skin to skin which mother " stated she was not doing at home.   Babys behavior at breast - fussy on R breast after first few minutes then sleepy and sucking non nutritive; feeding at breast on R ended and baby unlatched per mother after 10 minutes due to no audible swallows   L side  15-20 minutes   R side  10 minutes  Total feed time 25-30  minutes  Post- feeding weight- 3746 grams  Babys post breastfeeding behavior - continued to exhibit feeding cues  Supplementation provided - father fed 1 oz of EBM via paced bottle feeding  Other- has Medela Pump In Style at home; discussed thoroughly supply/demand principles; signs of milk transfer; and mother's milk supply is low due to breasts not stimulated and emptied properly by baby or pump 8 or more times/24 hours      Recommended Interventions and Plan of Care for Kathy Vera      __x___ Breastfeed baby  on cue until content at least 8-12 times in 24hrs.   Cluster feeds every 1-2hrs are common.    __x___ Use the asymmetric latch as demonstrated during the consult.   Go to www[employer] and [employer] to view at home.    __x__  Use breast compression during pauses in sucking as demonstrated during the              consult.    __x___ Observe for signs of milk transfer to baby:   wide pauses in the sucks   swallows throughout  the feedings   milk on the babys lips when removed from the breast   wet nipple as it comes out of the babys mouth   heavy breasts before a feeding and softer breasts after the feeding    __x__ Nurse baby as long as baby is nursing actively and nutritively with audible swallows; if the baby is not nursing well with signs of milk transfer then supplement the baby  until content and pump the breasts until empty and store the milk for the next feeding.    __x__ Supplement the baby with breastmilk first then formula if necessary by using paced bottle feeding technique   After nursing until the baby is content.    __x___ Pump with a Medela  Pump In Style pump for 20-30 min or 2 minutes after the last drop seen   After nursing or if baby does not go to breast for feeding     _N/A__Treat engorgement:  use warmth for 10-15 min. with massage before every    feeding, soften the front of the breasts by expressing a small amount of milk    before latch attempts, latch baby onto breasts and nurse until content, use an  ice   pack wrapped in a thin towel/pillow case  on breasts for 20min after every   feeding.  Repeat with the babys cues or every 2hrs by waking baby until resolved.    _N/A_ Treat routine sore nipples:  correct positioning and latch on, break suction when   baby removed from breast, rub expressed breastmilk  and/or lanolin into nipple    after every feeding, begin feeding on least sore  nipple, use different positions.    __x___ Count and record the number of feedings, urine diapers, and dirty diapers every    24hrs.    __x___ Clean all breastfeeding aids with warm soapy water after each use and sterilize each day.    __x___ Other - information on herbal supplements given- Fenugreek/Blessed Thistle or Malunggay         Merlyn Gibson RN

## 2017-09-20 ENCOUNTER — TELEPHONE (OUTPATIENT)
Dept: OBSTETRICS AND GYNECOLOGY | Facility: CLINIC | Age: 37
End: 2017-09-20

## 2017-09-21 ENCOUNTER — TELEPHONE (OUTPATIENT)
Dept: LACTATION | Facility: CLINIC | Age: 37
End: 2017-09-21

## 2017-10-04 ENCOUNTER — POSTPARTUM VISIT (OUTPATIENT)
Dept: OBSTETRICS AND GYNECOLOGY | Facility: CLINIC | Age: 37
End: 2017-10-04
Payer: COMMERCIAL

## 2017-10-04 VITALS
DIASTOLIC BLOOD PRESSURE: 60 MMHG | BODY MASS INDEX: 24.91 KG/M2 | SYSTOLIC BLOOD PRESSURE: 100 MMHG | WEIGHT: 154.31 LBS

## 2017-10-04 PROBLEM — N89.8 VAGINAL DISCHARGE: Status: RESOLVED | Noted: 2017-08-31 | Resolved: 2017-10-04

## 2017-10-04 PROBLEM — O26.899 CARPAL TUNNEL SYNDROME DURING PREGNANCY: Status: RESOLVED | Noted: 2017-07-10 | Resolved: 2017-10-04

## 2017-10-04 PROBLEM — Z67.20 BLOOD TYPE B+: Status: RESOLVED | Noted: 2017-05-03 | Resolved: 2017-10-04

## 2017-10-04 PROBLEM — G56.00 CARPAL TUNNEL SYNDROME DURING PREGNANCY: Status: RESOLVED | Noted: 2017-07-10 | Resolved: 2017-10-04

## 2017-10-04 PROBLEM — R73.09 ELEVATED GLUCOSE: Status: RESOLVED | Noted: 2017-06-27 | Resolved: 2017-10-04

## 2017-10-04 PROCEDURE — 0503F POSTPARTUM CARE VISIT: CPT | Mod: S$GLB,,, | Performed by: ADVANCED PRACTICE MIDWIFE

## 2017-10-04 PROCEDURE — 99999 PR PBB SHADOW E&M-EST. PATIENT-LVL II: CPT | Mod: PBBFAC,,, | Performed by: ADVANCED PRACTICE MIDWIFE

## 2017-10-04 NOTE — PROGRESS NOTES
Subjective:       Kathy Vera is a 37 y.o. female who presents for a postpartum visit. She is 4 weeks postpartum following a spontaneous vaginal delivery. I have fully reviewed the prenatal and intrapartum course. The delivery was at term gestational weeks. Outcome: spontaneous vaginal delivery. Anesthesia: none. Postpartum course has been complicated by breast and nipple pain with breastfeeding which has improved with help of lactation.  Some nipple sorenes continues though, present on initial latch and when beginning pumping.  Occasional shooting breast pains.. Baby's course has been uncomplicated. Baby is feeding by both breast and bottle - is hoping to decrease formula use now that breast feeding is going better. Bleeding staining only. Bowel function is having some constipation which is common for her. Bladder function is normal but with some stress incontinence, encouraged to start kegel exercises. Patient is not sexually active. Contraception method is condoms and [facility] Postpartum depression screenin.    The following portions of the patient's history were reviewed and updated as appropriate: allergies, current medications, past family history, past medical history, past social history, past surgical history and problem list.    Review of Systems  Pertinent items are noted in HPI.     Objective:      /60   Wt 70 kg (154 lb 5.2 oz)   LMP 12/10/2016   Breastfeeding? Yes   BMI 24.91 kg/m²    General:  alert, appears stated age and cooperative    Breasts:  nipples pink, tender to touch   Lungs: clear to auscultation bilaterally   Heart:  not examined   Abdomen: soft, nontender    Vulva:  normal   Vagina: normal vagina, no discharge, exudate, lesion, or erythema   Cervix:  no cervical motion tenderness and no lesions   Corpus: normal size, contour, position, consistency, mobility, non-tender   Adnexa:  normal adnexa and no mass, fullness, tenderness   Rectal Exam: Not performed.          Assessment:        normal postpartum exam. Pap smear not done at today's visit.     Plan:      1. Contraception: condoms  2. Breast and nipple soreness, encouraged to f/u with lactation as needed and rx for Dr. Oral Decker's nipple cream called into [facility].  3. Follow up in: 1 year or as needed. pap due fall 2019

## 2020-11-30 ENCOUNTER — OFFICE VISIT (OUTPATIENT)
Dept: ORTHOPEDICS | Facility: CLINIC | Age: 40
End: 2020-11-30
Payer: COMMERCIAL

## 2020-11-30 ENCOUNTER — HOSPITAL ENCOUNTER (OUTPATIENT)
Dept: RADIOLOGY | Facility: HOSPITAL | Age: 40
Discharge: HOME | End: 2020-11-30
Attending: ORTHOPAEDIC SURGERY
Payer: COMMERCIAL

## 2020-11-30 VITALS — WEIGHT: 156.75 LBS | HEIGHT: 66 IN | BODY MASS INDEX: 25.19 KG/M2

## 2020-11-30 DIAGNOSIS — M79.641 BILATERAL HAND PAIN: ICD-10-CM

## 2020-11-30 DIAGNOSIS — G56.03 CARPAL TUNNEL SYNDROME, BILATERAL: Primary | ICD-10-CM

## 2020-11-30 DIAGNOSIS — N91.2 AMENORRHEA: ICD-10-CM

## 2020-11-30 DIAGNOSIS — M79.642 BILATERAL HAND PAIN: ICD-10-CM

## 2020-11-30 DIAGNOSIS — M79.641 BILATERAL HAND PAIN: Primary | ICD-10-CM

## 2020-11-30 DIAGNOSIS — M79.642 BILATERAL HAND PAIN: Primary | ICD-10-CM

## 2020-11-30 PROCEDURE — 73130 X-RAY EXAM OF HAND: CPT | Mod: 26,LT,, | Performed by: RADIOLOGY

## 2020-11-30 PROCEDURE — 73130 PR  X-RAY HAND 3+ VW: ICD-10-PCS | Mod: 26,LT,, | Performed by: RADIOLOGY

## 2020-11-30 PROCEDURE — 99204 PR OFFICE/OUTPT VISIT, NEW, LEVL IV, 45-59 MIN: ICD-10-PCS | Mod: S$GLB,,, | Performed by: ORTHOPAEDIC SURGERY

## 2020-11-30 PROCEDURE — 99999 PR PBB SHADOW E&M-EST. PATIENT-LVL II: ICD-10-PCS | Mod: PBBFAC,,, | Performed by: ORTHOPAEDIC SURGERY

## 2020-11-30 PROCEDURE — 99999 PR PBB SHADOW E&M-EST. PATIENT-LVL II: CPT | Mod: PBBFAC,,, | Performed by: ORTHOPAEDIC SURGERY

## 2020-11-30 PROCEDURE — 3008F BODY MASS INDEX DOCD: CPT | Mod: CPTII,S$GLB,, | Performed by: ORTHOPAEDIC SURGERY

## 2020-11-30 PROCEDURE — 3008F PR BODY MASS INDEX (BMI) DOCUMENTED: ICD-10-PCS | Mod: CPTII,S$GLB,, | Performed by: ORTHOPAEDIC SURGERY

## 2020-11-30 PROCEDURE — 73130 X-RAY EXAM OF HAND: CPT | Mod: 26,RT,, | Performed by: RADIOLOGY

## 2020-11-30 PROCEDURE — 73130 X-RAY EXAM OF HAND: CPT | Mod: TC,50,PO

## 2020-11-30 PROCEDURE — 99204 OFFICE O/P NEW MOD 45 MIN: CPT | Mod: S$GLB,,, | Performed by: ORTHOPAEDIC SURGERY

## 2020-11-30 NOTE — PROGRESS NOTES
"  [facility]  History & Physical  Orthopedics    SUBJECTIVE:      COVID-19 attestation:  This patient was treated during the COVID-19 pandemic.  This was discussed with the patient, they are aware of our current policies and procedures, were given the option of delaying their visit and or switching to a virtual visit, delaying their surgery when applicable, and they elect to proceed.    Chief Complaint:   Chief Complaint   Patient presents with    Right Hand - Pain    Left Hand - Pain       Referring Provider: N/A    History of Present Illness:  Patient is a 40 y.o. right hand dominant female who presents today with complaints of bilateral numbness/tingling and pain in the hands. She states that the right is worse than the left, she states at times it takes an hour for the sensation to return to the hand. She notices the pain going into her forearm in the right. She has noticed some differences in her  strength and state sometimes she feels a "shock" if she tries to pick some things up, she also has difficulty brushing her teeth in the morning. She has a history of carpal tunnel with her first pregnancy, about 3 years ago. She states that the symptoms went away. She was given hard plastic braces which she attempted to wear at night. She is actively trying to get pregnant again. Today a pregnancy test was performed in clinic to determine if she could get x-rays, this was negative and x-rays were obtained. The patient has full ROM.    Onset of symptoms/DOI was a few months ago.    Symptoms are aggravated by activity, movement and at night.    Symptoms are alleviated by rest and immobilization.    Symptoms consist of pain.    The patient rates their pain as a 4/10.    Attempted treatment(s) and/or interventions include rest, activity modification and immobilization.     The patient denies any fevers, chills, N/V, D/C and presents for evaluation.       Past Medical History:   Diagnosis Date " "   Blood type B POS 5/3/2017    Hyperlipidemia     never medicated     Past Surgical History:   Procedure Laterality Date    WISDOM TOOTH EXTRACTION       Review of patient's allergies indicates:  No Known Allergies  Social History     Social History Narrative    Elio is a     She works at [employer]    First baby for both!     Family History   Problem Relation Age of Onset    Hypertension Mother     Breast cancer Neg Hx     Colon cancer Neg Hx     Ovarian cancer Neg Hx          Current Outpatient Medications:     PNV95/FERROUS FUMARATE/FA (PRENATAL MULTIVITAMINS ORAL), Take by mouth., Disp: , Rfl:     ROS    Review of Systems:  Constitutional: no fever or chills  Eyes: no visual changes  ENT: no nasal congestion or sore throat  Respiratory: no cough or shortness of breath  Cardiovascular: no chest pain  Gastrointestinal: no nausea or vomiting, tolerating diet  Musculoskeletal: pain, soreness and numbness/tingling    OBJECTIVE:      Vital Signs (Most Recent):  Vitals:    11/30/20 1638   Weight: 71.1 kg (156 lb 12 oz)   Height: 5' 6" (1.676 m)     Body mass index is 25.3 kg/m².    Physical Exam    Physical Exam:  Constitutional: The patient appears well-developed and well-nourished. No distress.   Head: Normocephalic and atraumatic.   Nose: Nose normal.   Eyes: Conjunctivae and EOM are normal.   Neck: No tracheal deviation present.   Cardiovascular: Normal rate and intact distal pulses.    Pulmonary/Chest: Effort normal. No respiratory distress.   Abdominal: There is no guarding.   Neurological: The patient is alert.   Psychiatric: The patient has a normal mood and affect.     Cervical Exam:  ROM full, supple  Negative Spurling's  Negative Hancock's    Bilateral Hand/Wrist Examination:    Observation/Inspection:  Swelling  none    Deformity  none  Discoloration  none     Scars   none    Atrophy  none    HAND/WRIST EXAMINATION:  Finkelstein's Test   Neg  WHAT Test    Neg  Snuff box " tenderness   Neg  CMC grind    Neg  Circumduction test   Neg    Neurovascular Exam:  Digits WWP, brisk CR < 3s throughout  2+ biceps and brachioradialis reflexes  NVI motor/LTS to M/R/U nerves, radial pulse 2+  Tinel's Test - Carpal Tunnel  Pos bilateral  Tinel's Test - Cubital Tunnel  Neg  Phalen's Test    Pos bilateral  Median Nerve Compression Test Pos bilateral  TFCC Compression Test  Neg   strength normal    Bilateral ROM hand/wrist/elbow full, painless    Diagnostic Results:     X-Ray Hand 3 View Bilateral  Narrative: EXAMINATION:  XR HAND COMPLETE 3 VIEWS BILATERAL    CLINICAL HISTORY:  Pain in right hand    FINDINGS:  Three views bilateral hands.    Right: There is ulnar negative variance.  No fracture dislocation bone destruction seen.    Left: No fracture dislocation bone destruction seen.  No erosions are seen.  Impression: No evidence of inflammatory erosive systemic arthritis.    Electronically signed by: Brandon Elliott MD  Date:    12/01/2020  Time:    07:55     Comments: I have personally reviewed the imaging and I agree with the above radiologist's report.      ASSESSMENT/PLAN:      40 y.o. yo female with   Encounter Diagnoses   Name Primary?    Amenorrhea     Carpal tunnel syndrome, bilateral Yes      Plan: Assessment: carpal tunnel syndrome - bilateral    With regards to her bilateral carpal tunnel syndrome, she may can continue to use bilateral carpal tunnel braces that she can wear at nighttime. I have recommended an EMG/NCS to assess the severity of her CTS and possible cervical involvement. If it does not improve, I would like to see her back over after the test to consider further interventions as indicated.    We have discussed the natural history of carpal tunnel syndrome and the possibility of cortisone shots as well.     Pregnancy test performed today which was negative.        Cheyanne Green MD     Please be aware that this note has been generated with the assistance of Pamela  voice-to-text.  Please excuse any spelling or grammatical errors.

## 2020-12-01 NOTE — PROGRESS NOTES
CRS Office Visit History and Physical    Referring Md:   Aaareferral Self  No address on file    SUBJECTIVE:     Chief Complaint: Hemorrhoids    History of Present Illness:  Patient is a 40 y.o. female presents with hemorrhoids for 3 years duration. She has been followed [facility] for this issue up until now, but desires to switch providers. She had her hemorrhoids banded this past summer and experienced significant clotting afterwards. She experiences daily bleeding that she notices on the toilet paper as well as in the toilet bowl. She is not taking any fiber supplement or stool softeners. She denies pain but endorsers discomfort when she wipes over the hemorrhoid.  She reports some intermittent mild tinismus. She denies rectal prolapse.  She denies abdominal pain, nausea or vomiting.    BMs solid and daily, no change, no straining to stool.  Reports regular diet.       Last Colonoscopy: 2020 with [facility], reportedly normal.   First was 3-5 years ago and polyps were found    No family history of colon cancer    Past Medical History:   Diagnosis Date    Blood type B POS 5/3/2017    Hyperlipidemia     never medicated       Past Surgical History:   Procedure Laterality Date    WISDOM TOOTH EXTRACTION         Review of patient's allergies indicates:  No Known Allergies    Current Outpatient Medications on File Prior to Visit   Medication Sig Dispense Refill    PNV95/FERROUS FUMARATE/FA (PRENATAL MULTIVITAMINS ORAL) Take by mouth.       No current facility-administered medications on file prior to visit.        Family History   Problem Relation Age of Onset    Hypertension Mother     Breast cancer Neg Hx     Colon cancer Neg Hx     Ovarian cancer Neg Hx        Social History     Tobacco Use    Smoking status: Never Smoker    Smokeless tobacco: Never Used   Substance Use Topics    Alcohol use: No     Comment: Pregnant     Drug use: No        Review of Systems:  ROS:  GENERAL: No fever, chills,  fatigability or weight loss.  Integument:  No rashes, redness, icterus  CHEST: Denies CELESTIN, cyanosis, wheezing, cough and sputum production.  CARDIOVASCULAR: Denies chest pain, PND, orthopnea or reduced exercise tolerance.  GI:  Denies abd pain, dysphagia, nausea, vomiting, no hematemesis, no rectal pain  : Denies burning on urination, no hematuria, no bacteriuria  MSK:  No deformities, swelling, joint pain swelling  Neurologic:  No HAs, seizures, weakness, paresthesias, gait problems      OBJECTIVE:     Vital Signs (Most Recent)  There were no vitals taken for this visit.    Physical Exam:  General: White female in no distress   Skin/ Sclera normal  LN non palpable  HEENT: anicteric, normocephalic, extraocular movements intact   Neck trachea midline  Chest symmetric, nl excursions, no retractions  Respiratory: respirations are even and unlabored     Anorectal:   Inspection   No external hemorrhoids or fissure  DONI:  nl tone, no masses, good squeeze, nl relaxation with Valsalva  Extremities: Warm dry and intact.  NO CCE  Neuro: alert and oriented x 4.  Moves all extremities.         ASSESSMENT/PLAN:   Bleeding, prolapsing internal hemorrhoids      Recommend  Anoscopy, rubber band ligation  OV 6 weeks  High fiber diet - 25 grams per day.  Emphasis on whole grain breads such as double fiber wheat bread and high fiber cereals and bars.    Drink 8 glasses of water per day 64 - 96 oz per day  Fiber supplements such as KONSYL, METAMUCIL can be taken 1-2 x per day  Regular exercise - 30 min brisk walking 5 days per week            Hemorrhoid Rubber Band Ligation Procedure Note    Verbal consent obtained for anoscopy and rubber band ligation.    The details of the procedure and the expected outcome discussed..  The risk of persistent symptoms, need for repeat banding, bleeding, infection discussed with pt.      Indications: The patient had a history of bleeding and prolapsing  internal hemorrhoids.    Pre-operative  Diagnosis: Internal hemorrhoids with bleeding and prolapse     Post-operative Diagnosis: Internal hemorrhoids with bleeding and prolapse    Surgeon: ANALILIA MENDOZA     Assistants: Callier    Findings at anoscopy:    Left lateral internal hemorrhoid grade 2  Right anterior internal hemorrhoid grade 2  Right posterior internal hemorrhoid grade 2    Procedure Details   Acritical timeout was performed.  Digital rectal exam was performed.  I then placed a lubricated anoscope into the anal canal. Suction band ligation was then performed of the internal hemorrhoids in the  left, right posterior positions.             Complications:  None; patient tolerated the procedure well.           Disposition: Discharge from office to home           Condition: stable    Instruction sheet given to patient  High fiber diet  Drink eight glasses of water per day  Miralax as necessary  OV in 6 weeks

## 2020-12-02 ENCOUNTER — OFFICE VISIT (OUTPATIENT)
Dept: SURGERY | Facility: CLINIC | Age: 40
End: 2020-12-02
Payer: COMMERCIAL

## 2020-12-02 VITALS
SYSTOLIC BLOOD PRESSURE: 110 MMHG | BODY MASS INDEX: 24.72 KG/M2 | DIASTOLIC BLOOD PRESSURE: 64 MMHG | WEIGHT: 153.81 LBS | HEIGHT: 66 IN

## 2020-12-02 DIAGNOSIS — K64.8 BLEEDING INTERNAL HEMORRHOIDS: ICD-10-CM

## 2020-12-02 DIAGNOSIS — K64.1 PROLAPSED INTERNAL HEMORRHOIDS, GRADE 2: ICD-10-CM

## 2020-12-02 PROCEDURE — 3008F PR BODY MASS INDEX (BMI) DOCUMENTED: ICD-10-PCS | Mod: CPTII,S$GLB,, | Performed by: COLON & RECTAL SURGERY

## 2020-12-02 PROCEDURE — 99203 OFFICE O/P NEW LOW 30 MIN: CPT | Mod: 25,S$GLB,, | Performed by: COLON & RECTAL SURGERY

## 2020-12-02 PROCEDURE — 1126F PR PAIN SEVERITY QUANTIFIED, NO PAIN PRESENT: ICD-10-PCS | Mod: S$GLB,,, | Performed by: COLON & RECTAL SURGERY

## 2020-12-02 PROCEDURE — 46221 PR HEMORRHOIDECTOMY INTERNAL RUBBER BAND LIGATIONS: ICD-10-PCS | Mod: S$GLB,,, | Performed by: COLON & RECTAL SURGERY

## 2020-12-02 PROCEDURE — 1126F AMNT PAIN NOTED NONE PRSNT: CPT | Mod: S$GLB,,, | Performed by: COLON & RECTAL SURGERY

## 2020-12-02 PROCEDURE — 3008F BODY MASS INDEX DOCD: CPT | Mod: CPTII,S$GLB,, | Performed by: COLON & RECTAL SURGERY

## 2020-12-02 PROCEDURE — 99999 PR PBB SHADOW E&M-EST. PATIENT-LVL II: ICD-10-PCS | Mod: PBBFAC,,, | Performed by: COLON & RECTAL SURGERY

## 2020-12-02 PROCEDURE — 46221 LIGATION OF HEMORRHOID(S): CPT | Mod: S$GLB,,, | Performed by: COLON & RECTAL SURGERY

## 2020-12-02 PROCEDURE — 99203 PR OFFICE/OUTPT VISIT, NEW, LEVL III, 30-44 MIN: ICD-10-PCS | Mod: 25,S$GLB,, | Performed by: COLON & RECTAL SURGERY

## 2020-12-02 PROCEDURE — 99999 PR PBB SHADOW E&M-EST. PATIENT-LVL II: CPT | Mod: PBBFAC,,, | Performed by: COLON & RECTAL SURGERY

## 2020-12-17 ENCOUNTER — PROCEDURE VISIT (OUTPATIENT)
Dept: PHYSICAL MEDICINE AND REHAB | Facility: CLINIC | Age: 40
End: 2020-12-17
Payer: COMMERCIAL

## 2020-12-17 DIAGNOSIS — G56.03 CARPAL TUNNEL SYNDROME, BILATERAL: ICD-10-CM

## 2020-12-17 PROCEDURE — 95886 PR EMG COMPLETE, W/ NERVE CONDUCTION STUDIES, 5+ MUSCLES: ICD-10-PCS | Mod: S$GLB,,, | Performed by: PHYSICAL MEDICINE & REHABILITATION

## 2020-12-17 PROCEDURE — 95886 MUSC TEST DONE W/N TEST COMP: CPT | Mod: S$GLB,,, | Performed by: PHYSICAL MEDICINE & REHABILITATION

## 2020-12-17 PROCEDURE — 95911 PR NERVE CONDUCTION STUDY; 9-10 STUDIES: ICD-10-PCS | Mod: S$GLB,,, | Performed by: PHYSICAL MEDICINE & REHABILITATION

## 2020-12-17 PROCEDURE — 95911 NRV CNDJ TEST 9-10 STUDIES: CPT | Mod: S$GLB,,, | Performed by: PHYSICAL MEDICINE & REHABILITATION

## 2020-12-17 NOTE — PROCEDURES
Test Date:  2020    Patient: Kathy Vera : 1980 Physician: Herve Melvin D.O.   ID#:  SEX: Female Ref. Phys: Cheyanne Green MD     HPI: Kathy Vera is a 40 y.o.female who presents for NCS/EMG to evaluate CTS and cervical radiculopathy bilaterally.  Symptoms are worse on the right.      NCV & EMG Findings:   Evaluation of the right median motor nerve showed prolonged distal onset latency.   The left median sensory nerve showed prolonged distal peak latency and decreased conduction velocity.   The right median sensory nerve showed prolonged distal peak latency, reduced amplitude, and decreased conduction velocity.   All remaining nerves (as indicated in the following tables) were within normal limits.   Needle evaluation of the right Abductor Pollicis Brevis muscle showed diminished recruitment.   All remaining muscles (as indicated in the following table) showed no evidence of electrical instability.    Impression:  There is electrophysiologic evidence of a bilateral (sensory on the left, sensorimotor on the right) median mononeuropathy across the wrist (I.e. Carpal tunnel syndrome).  There is no motor axonal loss.  There is no active denervation.  This is graded as Moderate in severity on the Right, and Mild on the Left.        ___________________________  Herve Melvin D.O.        NCS+  Motor Nerve Results      Latency Amplitude F-Lat Segment Distance CV Comment   Site (ms) Norm (mV) Norm (ms)  (cm) (m/s) Norm    Left Median (APB)   Wrist 4.4  < 4.4 6.8  > 4.2  Wrist-Palm - - -    Elbow 8.3 - 4.5 -  Elbow-Wrist 22 56  > 51    Right Median (APB)   Wrist *6.0  < 4.4 9.1  > 4.2  Wrist-Palm - - -    Elbow 10.5 - 8.7 -  Elbow-Wrist 23 51  > 51    Left Ulnar (ADM)   Wrist 3.1  < 3.7 12.8  > 3.0         Bel Elbow 6.9 - 10.7 -  Bel Elbow-Wrist 26 68  > 52    Abv Elbow 8.8 - 10.9 -  Abv Elbow-Bel Elbow 14 74  > 43    Right Ulnar (ADM)   Wrist 2.3  < 3.7 4.9  > 3.0         Bel Elbow  5.7 - 5.2 -  Bel Elbow-Wrist 25 74  > 52    Abv Elbow 7.3 - 4.8 -  Abv Elbow-Bel Elbow 14 88  > 43      Sensory Nerve Results      Latency (Peak) Amplitude (P-P) Segment Distance CV Comment   Site (ms) Norm (µV) Norm  (cm) (m/s) Norm    Left Median   Wrist-Dig II *4.5  < 4.0 16  > 13 Wrist-Dig II 14 *31  > 39    Right Median   Wrist-Dig II *6.2  < 4.0 *11  > 13 Wrist-Dig II 14 *23  > 39    Left Ulnar   Wrist-Dig V 3.0  < 4.0 26  > 8 Wrist-Dig V 14 47  > 38    Right Ulnar   Wrist-Dig V 2.8  < 4.0 91  > 8 Wrist-Dig V 14 50  > 38    Left Radial   Forearm-Wrist 1.58  < 2.8 20  > 11 Forearm-Wrist 10 63 -    Right Radial   Forearm-Wrist 1.50  < 2.8 37  > 11 Forearm-Wrist 10 67 -      EMG+     Side Muscle Nerve Root Ins Act Fibs Psw Amp Dur Poly Recrt Int Pat Comment   Right Deltoid Axillary C5-C6 Nml Nml Nml Nml Nml 0 Nml Nml    Right Triceps Radial C6-C8 Nml Nml Nml Nml Nml 0 Nml Nml    Right Pronator Teres Median C6-C7 Nml Nml Nml Nml Nml 0 Nml Nml    Right EDC Radial,  Posterior In... C7-C8 Nml Nml Nml Nml Nml 0 Nml Nml    Right APB Median C8-T1 Nml Nml Nml Nml Nml 0 *Reduced Nml    Left Deltoid Axillary C5-C6 Nml Nml Nml Nml Nml 0 Nml Nml    Left Triceps Radial C6-C8 Nml Nml Nml Nml Nml 0 Nml Nml    Left Pronator Teres Median C6-C7 Nml Nml Nml Nml Nml 0 Nml Nml    Left EDC Radial,  Posterior In... C7-C8 Nml Nml Nml Nml Nml 0 Nml Nml    Left APB Median C8-T1 Nml Nml Nml Nml Nml 0 Nml Nml            Waveforms:    Motor              Sensory

## 2020-12-23 ENCOUNTER — OFFICE VISIT (OUTPATIENT)
Dept: ORTHOPEDICS | Facility: CLINIC | Age: 40
End: 2020-12-23
Payer: COMMERCIAL

## 2020-12-23 ENCOUNTER — TELEPHONE (OUTPATIENT)
Dept: ORTHOPEDICS | Facility: CLINIC | Age: 40
End: 2020-12-23

## 2020-12-23 DIAGNOSIS — G56.03 CARPAL TUNNEL SYNDROME, BILATERAL: Primary | ICD-10-CM

## 2020-12-23 PROCEDURE — 99212 PR OFFICE/OUTPT VISIT, EST, LEVL II, 10-19 MIN: ICD-10-PCS | Mod: S$GLB,,, | Performed by: ORTHOPAEDIC SURGERY

## 2020-12-23 PROCEDURE — 99999 PR PBB SHADOW E&M-EST. PATIENT-LVL I: CPT | Mod: PBBFAC,,, | Performed by: ORTHOPAEDIC SURGERY

## 2020-12-23 PROCEDURE — 99212 OFFICE O/P EST SF 10 MIN: CPT | Mod: S$GLB,,, | Performed by: ORTHOPAEDIC SURGERY

## 2020-12-23 PROCEDURE — 99999 PR PBB SHADOW E&M-EST. PATIENT-LVL I: ICD-10-PCS | Mod: PBBFAC,,, | Performed by: ORTHOPAEDIC SURGERY

## 2020-12-28 ENCOUNTER — ANESTHESIA EVENT (OUTPATIENT)
Dept: SURGERY | Facility: HOSPITAL | Age: 40
End: 2020-12-28
Payer: COMMERCIAL

## 2020-12-29 ENCOUNTER — TELEPHONE (OUTPATIENT)
Dept: ORTHOPEDICS | Facility: CLINIC | Age: 40
End: 2020-12-29

## 2020-12-29 DIAGNOSIS — Z01.818 PRE-OP TESTING: Primary | ICD-10-CM

## 2020-12-29 NOTE — TELEPHONE ENCOUNTER
Spoke with patient today informing her of her COVID test on Saturday. I informed her per Enma that she could arrive anytime between 9am-4pm on Saturday. She understood and did not have any further questions.

## 2021-01-02 ENCOUNTER — LAB VISIT (OUTPATIENT)
Dept: INTERNAL MEDICINE | Facility: CLINIC | Age: 41
End: 2021-01-02
Payer: COMMERCIAL

## 2021-01-02 DIAGNOSIS — Z01.818 PRE-OP TESTING: ICD-10-CM

## 2021-01-02 PROCEDURE — U0003 INFECTIOUS AGENT DETECTION BY NUCLEIC ACID (DNA OR RNA); SEVERE ACUTE RESPIRATORY SYNDROME CORONAVIRUS 2 (SARS-COV-2) (CORONAVIRUS DISEASE [COVID-19]), AMPLIFIED PROBE TECHNIQUE, MAKING USE OF HIGH THROUGHPUT TECHNOLOGIES AS DESCRIBED BY CMS-2020-01-R: HCPCS

## 2021-01-03 LAB — SARS-COV-2 RNA RESP QL NAA+PROBE: NOT DETECTED

## 2021-01-04 ENCOUNTER — TELEPHONE (OUTPATIENT)
Dept: ORTHOPEDICS | Facility: CLINIC | Age: 41
End: 2021-01-04

## 2021-01-04 ENCOUNTER — PATIENT MESSAGE (OUTPATIENT)
Dept: SURGERY | Facility: HOSPITAL | Age: 41
End: 2021-01-04

## 2021-01-05 ENCOUNTER — ANESTHESIA (OUTPATIENT)
Dept: SURGERY | Facility: HOSPITAL | Age: 41
End: 2021-01-05
Payer: COMMERCIAL

## 2021-01-06 ENCOUNTER — PATIENT MESSAGE (OUTPATIENT)
Dept: ORTHOPEDICS | Facility: CLINIC | Age: 41
End: 2021-01-06

## 2021-01-06 DIAGNOSIS — Z01.818 PRE-OP TESTING: Primary | ICD-10-CM

## 2021-01-12 ENCOUNTER — TELEPHONE (OUTPATIENT)
Dept: SURGERY | Facility: CLINIC | Age: 41
End: 2021-01-12

## 2021-01-13 ENCOUNTER — OFFICE VISIT (OUTPATIENT)
Dept: SURGERY | Facility: CLINIC | Age: 41
End: 2021-01-13
Payer: COMMERCIAL

## 2021-01-13 ENCOUNTER — PATIENT MESSAGE (OUTPATIENT)
Dept: SURGERY | Facility: HOSPITAL | Age: 41
End: 2021-01-13

## 2021-01-13 VITALS
BODY MASS INDEX: 25.02 KG/M2 | HEART RATE: 74 BPM | WEIGHT: 155.69 LBS | SYSTOLIC BLOOD PRESSURE: 117 MMHG | HEIGHT: 66 IN | DIASTOLIC BLOOD PRESSURE: 60 MMHG

## 2021-01-13 DIAGNOSIS — K64.1 PROLAPSED INTERNAL HEMORRHOIDS, GRADE 2: ICD-10-CM

## 2021-01-13 DIAGNOSIS — K62.5 ANAL BLEEDING: Primary | ICD-10-CM

## 2021-01-13 DIAGNOSIS — K64.8 BLEEDING INTERNAL HEMORRHOIDS: ICD-10-CM

## 2021-01-13 PROCEDURE — 99213 OFFICE O/P EST LOW 20 MIN: CPT | Mod: 25,S$GLB,, | Performed by: COLON & RECTAL SURGERY

## 2021-01-13 PROCEDURE — 3008F PR BODY MASS INDEX (BMI) DOCUMENTED: ICD-10-PCS | Mod: CPTII,S$GLB,, | Performed by: COLON & RECTAL SURGERY

## 2021-01-13 PROCEDURE — 99213 PR OFFICE/OUTPT VISIT, EST, LEVL III, 20-29 MIN: ICD-10-PCS | Mod: 25,S$GLB,, | Performed by: COLON & RECTAL SURGERY

## 2021-01-13 PROCEDURE — 1126F PR PAIN SEVERITY QUANTIFIED, NO PAIN PRESENT: ICD-10-PCS | Mod: S$GLB,,, | Performed by: COLON & RECTAL SURGERY

## 2021-01-13 PROCEDURE — 3008F BODY MASS INDEX DOCD: CPT | Mod: CPTII,S$GLB,, | Performed by: COLON & RECTAL SURGERY

## 2021-01-13 PROCEDURE — 99999 PR PBB SHADOW E&M-EST. PATIENT-LVL III: ICD-10-PCS | Mod: PBBFAC,,, | Performed by: COLON & RECTAL SURGERY

## 2021-01-13 PROCEDURE — 1126F AMNT PAIN NOTED NONE PRSNT: CPT | Mod: S$GLB,,, | Performed by: COLON & RECTAL SURGERY

## 2021-01-13 PROCEDURE — 46221 LIGATION OF HEMORRHOID(S): CPT | Mod: S$GLB,,, | Performed by: COLON & RECTAL SURGERY

## 2021-01-13 PROCEDURE — 46221 PR HEMORRHOIDECTOMY INTERNAL RUBBER BAND LIGATIONS: ICD-10-PCS | Mod: S$GLB,,, | Performed by: COLON & RECTAL SURGERY

## 2021-01-13 PROCEDURE — 99999 PR PBB SHADOW E&M-EST. PATIENT-LVL III: CPT | Mod: PBBFAC,,, | Performed by: COLON & RECTAL SURGERY

## 2021-01-14 ENCOUNTER — TELEPHONE (OUTPATIENT)
Dept: ORTHOPEDICS | Facility: CLINIC | Age: 41
End: 2021-01-14

## 2021-01-15 ENCOUNTER — TELEPHONE (OUTPATIENT)
Dept: ORTHOPEDICS | Facility: CLINIC | Age: 41
End: 2021-01-15

## 2021-01-15 DIAGNOSIS — G56.03 CARPAL TUNNEL SYNDROME, BILATERAL: Primary | ICD-10-CM

## 2021-01-15 RX ORDER — TRAMADOL HYDROCHLORIDE 50 MG/1
50 TABLET ORAL EVERY 6 HOURS
Qty: 25 TABLET | Refills: 0 | Status: SHIPPED | OUTPATIENT
Start: 2021-01-15

## 2021-01-15 RX ORDER — TRAMADOL HYDROCHLORIDE 50 MG/1
50 TABLET ORAL EVERY 6 HOURS
Qty: 25 TABLET | Refills: 0 | Status: SHIPPED | OUTPATIENT
Start: 2021-01-15 | End: 2021-01-15 | Stop reason: CLARIF

## 2021-01-16 ENCOUNTER — LAB VISIT (OUTPATIENT)
Dept: INTERNAL MEDICINE | Facility: CLINIC | Age: 41
End: 2021-01-16
Payer: COMMERCIAL

## 2021-01-16 DIAGNOSIS — Z01.818 PRE-OP TESTING: ICD-10-CM

## 2021-01-16 PROCEDURE — U0003 INFECTIOUS AGENT DETECTION BY NUCLEIC ACID (DNA OR RNA); SEVERE ACUTE RESPIRATORY SYNDROME CORONAVIRUS 2 (SARS-COV-2) (CORONAVIRUS DISEASE [COVID-19]), AMPLIFIED PROBE TECHNIQUE, MAKING USE OF HIGH THROUGHPUT TECHNOLOGIES AS DESCRIBED BY CMS-2020-01-R: HCPCS

## 2021-01-17 LAB — SARS-COV-2 RNA RESP QL NAA+PROBE: NOT DETECTED

## 2021-01-19 ENCOUNTER — HOSPITAL ENCOUNTER (OUTPATIENT)
Facility: HOSPITAL | Age: 41
Discharge: HOME | End: 2021-01-19
Attending: ORTHOPAEDIC SURGERY | Admitting: ORTHOPAEDIC SURGERY
Payer: COMMERCIAL

## 2021-01-19 VITALS
RESPIRATION RATE: 14 BRPM | DIASTOLIC BLOOD PRESSURE: 73 MMHG | TEMPERATURE: 97 F | HEART RATE: 58 BPM | BODY MASS INDEX: 24.91 KG/M2 | SYSTOLIC BLOOD PRESSURE: 101 MMHG | HEIGHT: 66 IN | OXYGEN SATURATION: 100 % | WEIGHT: 155 LBS

## 2021-01-19 DIAGNOSIS — G56.00 CARPAL TUNNEL SYNDROME: Primary | ICD-10-CM

## 2021-01-19 LAB
B-HCG UR QL: NEGATIVE
CTP QC/QA: YES

## 2021-01-19 PROCEDURE — 37000008 HC ANESTHESIA 1ST 15 MINUTES: Performed by: ORTHOPAEDIC SURGERY

## 2021-01-19 PROCEDURE — 25000003 PHARM REV CODE 250: Performed by: NURSE ANESTHETIST, CERTIFIED REGISTERED

## 2021-01-19 PROCEDURE — 36000707: Performed by: ORTHOPAEDIC SURGERY

## 2021-01-19 PROCEDURE — D9220A PRA ANESTHESIA: Mod: ANES,,, | Performed by: ANESTHESIOLOGY

## 2021-01-19 PROCEDURE — 63600175 PHARM REV CODE 636 W HCPCS: Performed by: NURSE ANESTHETIST, CERTIFIED REGISTERED

## 2021-01-19 PROCEDURE — 27201423 OPTIME MED/SURG SUP & DEVICES STERILE SUPPLY: Performed by: ORTHOPAEDIC SURGERY

## 2021-01-19 PROCEDURE — 64721 PR REVISE MEDIAN N/CARPAL TUNNEL SURG: ICD-10-PCS | Mod: RT,,, | Performed by: ORTHOPAEDIC SURGERY

## 2021-01-19 PROCEDURE — 71000033 HC RECOVERY, INTIAL HOUR: Performed by: ORTHOPAEDIC SURGERY

## 2021-01-19 PROCEDURE — D9220A PRA ANESTHESIA: Mod: CRNA,,, | Performed by: NURSE ANESTHETIST, CERTIFIED REGISTERED

## 2021-01-19 PROCEDURE — 94761 N-INVAS EAR/PLS OXIMETRY MLT: CPT

## 2021-01-19 PROCEDURE — D9220A PRA ANESTHESIA: ICD-10-PCS | Mod: ANES,,, | Performed by: ANESTHESIOLOGY

## 2021-01-19 PROCEDURE — 25000003 PHARM REV CODE 250: Performed by: STUDENT IN AN ORGANIZED HEALTH CARE EDUCATION/TRAINING PROGRAM

## 2021-01-19 PROCEDURE — 64721 CARPAL TUNNEL SURGERY: CPT | Mod: RT,,, | Performed by: ORTHOPAEDIC SURGERY

## 2021-01-19 PROCEDURE — 20526 THER INJECTION CARP TUNNEL: CPT | Mod: 59,51,LT, | Performed by: ORTHOPAEDIC SURGERY

## 2021-01-19 PROCEDURE — D9220A PRA ANESTHESIA: ICD-10-PCS | Mod: CRNA,,, | Performed by: NURSE ANESTHETIST, CERTIFIED REGISTERED

## 2021-01-19 PROCEDURE — 71000015 HC POSTOP RECOV 1ST HR: Performed by: ORTHOPAEDIC SURGERY

## 2021-01-19 PROCEDURE — 25000003 PHARM REV CODE 250: Performed by: ORTHOPAEDIC SURGERY

## 2021-01-19 PROCEDURE — 36000706: Performed by: ORTHOPAEDIC SURGERY

## 2021-01-19 PROCEDURE — 99900035 HC TECH TIME PER 15 MIN (STAT)

## 2021-01-19 PROCEDURE — 63600175 PHARM REV CODE 636 W HCPCS: Performed by: STUDENT IN AN ORGANIZED HEALTH CARE EDUCATION/TRAINING PROGRAM

## 2021-01-19 PROCEDURE — 20526 PR INJECT CARPAL TUNNEL: ICD-10-PCS | Mod: 59,51,LT, | Performed by: ORTHOPAEDIC SURGERY

## 2021-01-19 PROCEDURE — 37000009 HC ANESTHESIA EA ADD 15 MINS: Performed by: ORTHOPAEDIC SURGERY

## 2021-01-19 RX ORDER — MUPIROCIN 20 MG/G
OINTMENT TOPICAL
Status: DISCONTINUED | OUTPATIENT
Start: 2021-01-19 | End: 2021-01-19 | Stop reason: HOSPADM

## 2021-01-19 RX ORDER — SODIUM CHLORIDE 0.9 % (FLUSH) 0.9 %
3 SYRINGE (ML) INJECTION
Status: DISCONTINUED | OUTPATIENT
Start: 2021-01-19 | End: 2021-01-19 | Stop reason: HOSPADM

## 2021-01-19 RX ORDER — BUPIVACAINE HYDROCHLORIDE 2.5 MG/ML
INJECTION, SOLUTION EPIDURAL; INFILTRATION; INTRACAUDAL
Status: DISCONTINUED | OUTPATIENT
Start: 2021-01-19 | End: 2021-01-19 | Stop reason: HOSPADM

## 2021-01-19 RX ORDER — SODIUM CHLORIDE 9 MG/ML
INJECTION, SOLUTION INTRAVENOUS CONTINUOUS
Status: DISCONTINUED | OUTPATIENT
Start: 2021-01-19 | End: 2021-01-19 | Stop reason: HOSPADM

## 2021-01-19 RX ORDER — CELECOXIB 200 MG/1
400 CAPSULE ORAL ONCE
Status: COMPLETED | OUTPATIENT
Start: 2021-01-19 | End: 2021-01-19

## 2021-01-19 RX ORDER — ACETAMINOPHEN 500 MG
1000 TABLET ORAL
Status: COMPLETED | OUTPATIENT
Start: 2021-01-19 | End: 2021-01-19

## 2021-01-19 RX ORDER — ONDANSETRON 2 MG/ML
4 INJECTION INTRAMUSCULAR; INTRAVENOUS DAILY PRN
Status: DISCONTINUED | OUTPATIENT
Start: 2021-01-19 | End: 2021-01-19 | Stop reason: HOSPADM

## 2021-01-19 RX ORDER — LIDOCAINE HYDROCHLORIDE 10 MG/ML
INJECTION, SOLUTION EPIDURAL; INFILTRATION; INTRACAUDAL; PERINEURAL
Status: DISCONTINUED | OUTPATIENT
Start: 2021-01-19 | End: 2021-01-19 | Stop reason: HOSPADM

## 2021-01-19 RX ORDER — ONDANSETRON 2 MG/ML
INJECTION INTRAMUSCULAR; INTRAVENOUS
Status: DISCONTINUED | OUTPATIENT
Start: 2021-01-19 | End: 2021-01-19

## 2021-01-19 RX ORDER — ACETAMINOPHEN 500 MG
1000 TABLET ORAL ONCE
Status: DISCONTINUED | OUTPATIENT
Start: 2021-01-19 | End: 2021-01-19 | Stop reason: SDUPTHER

## 2021-01-19 RX ORDER — SODIUM CHLORIDE 0.9 % (FLUSH) 0.9 %
10 SYRINGE (ML) INJECTION
Status: DISCONTINUED | OUTPATIENT
Start: 2021-01-19 | End: 2021-01-19 | Stop reason: HOSPADM

## 2021-01-19 RX ORDER — PROPOFOL 10 MG/ML
VIAL (ML) INTRAVENOUS CONTINUOUS PRN
Status: DISCONTINUED | OUTPATIENT
Start: 2021-01-19 | End: 2021-01-19

## 2021-01-19 RX ORDER — MIDAZOLAM HYDROCHLORIDE 1 MG/ML
INJECTION, SOLUTION INTRAMUSCULAR; INTRAVENOUS
Status: DISCONTINUED | OUTPATIENT
Start: 2021-01-19 | End: 2021-01-19

## 2021-01-19 RX ORDER — HYDROCODONE BITARTRATE AND ACETAMINOPHEN 5; 325 MG/1; MG/1
1 TABLET ORAL EVERY 4 HOURS PRN
Status: DISCONTINUED | OUTPATIENT
Start: 2021-01-19 | End: 2021-01-19 | Stop reason: HOSPADM

## 2021-01-19 RX ORDER — LIDOCAINE HYDROCHLORIDE 20 MG/ML
INJECTION INTRAVENOUS
Status: DISCONTINUED | OUTPATIENT
Start: 2021-01-19 | End: 2021-01-19

## 2021-01-19 RX ORDER — CELECOXIB 200 MG/1
400 CAPSULE ORAL ONCE
Status: DISCONTINUED | OUTPATIENT
Start: 2021-01-19 | End: 2021-01-19 | Stop reason: SDUPTHER

## 2021-01-19 RX ORDER — DEXMEDETOMIDINE HYDROCHLORIDE 100 UG/ML
INJECTION, SOLUTION INTRAVENOUS
Status: DISCONTINUED | OUTPATIENT
Start: 2021-01-19 | End: 2021-01-19

## 2021-01-19 RX ORDER — MEPERIDINE HYDROCHLORIDE 50 MG/ML
12.5 INJECTION INTRAMUSCULAR; INTRAVENOUS; SUBCUTANEOUS ONCE AS NEEDED
Status: DISCONTINUED | OUTPATIENT
Start: 2021-01-19 | End: 2021-01-19 | Stop reason: HOSPADM

## 2021-01-19 RX ORDER — MUPIROCIN 20 MG/G
OINTMENT TOPICAL 2 TIMES DAILY
Status: DISCONTINUED | OUTPATIENT
Start: 2021-01-19 | End: 2021-01-19 | Stop reason: HOSPADM

## 2021-01-19 RX ORDER — CEFAZOLIN SODIUM 1 G/3ML
2 INJECTION, POWDER, FOR SOLUTION INTRAMUSCULAR; INTRAVENOUS
Status: COMPLETED | OUTPATIENT
Start: 2021-01-19 | End: 2021-01-19

## 2021-01-19 RX ORDER — FENTANYL CITRATE 50 UG/ML
INJECTION, SOLUTION INTRAMUSCULAR; INTRAVENOUS
Status: DISCONTINUED | OUTPATIENT
Start: 2021-01-19 | End: 2021-01-19

## 2021-01-19 RX ORDER — HYDROMORPHONE HYDROCHLORIDE 1 MG/ML
0.2 INJECTION, SOLUTION INTRAMUSCULAR; INTRAVENOUS; SUBCUTANEOUS EVERY 5 MIN PRN
Status: DISCONTINUED | OUTPATIENT
Start: 2021-01-19 | End: 2021-01-19 | Stop reason: HOSPADM

## 2021-01-19 RX ORDER — VITAMIN B COMPLEX
1 CAPSULE ORAL DAILY
COMMUNITY

## 2021-01-19 RX ADMIN — PROPOFOL 150 MCG/KG/MIN: 10 INJECTION, EMULSION INTRAVENOUS at 08:01

## 2021-01-19 RX ADMIN — CELECOXIB 400 MG: 200 CAPSULE ORAL at 06:01

## 2021-01-19 RX ADMIN — MUPIROCIN: 20 OINTMENT TOPICAL at 06:01

## 2021-01-19 RX ADMIN — CEFAZOLIN 2 G: 330 INJECTION, POWDER, FOR SOLUTION INTRAMUSCULAR; INTRAVENOUS at 08:01

## 2021-01-19 RX ADMIN — ONDANSETRON 4 MG: 2 INJECTION, SOLUTION INTRAMUSCULAR; INTRAVENOUS at 08:01

## 2021-01-19 RX ADMIN — LIDOCAINE HYDROCHLORIDE 60 MG: 20 INJECTION, SOLUTION INTRAVENOUS at 08:01

## 2021-01-19 RX ADMIN — SODIUM CHLORIDE 125 ML/HR: 0.9 INJECTION, SOLUTION INTRAVENOUS at 06:01

## 2021-01-19 RX ADMIN — MIDAZOLAM HYDROCHLORIDE 2 MG: 1 INJECTION, SOLUTION INTRAMUSCULAR; INTRAVENOUS at 07:01

## 2021-01-19 RX ADMIN — FENTANYL CITRATE 50 MCG: 50 INJECTION, SOLUTION INTRAMUSCULAR; INTRAVENOUS at 08:01

## 2021-01-19 RX ADMIN — ACETAMINOPHEN 1000 MG: 500 TABLET ORAL at 06:01

## 2021-01-19 RX ADMIN — DEXMEDETOMIDINE HYDROCHLORIDE 12 MCG: 100 INJECTION, SOLUTION, CONCENTRATE INTRAVENOUS at 07:01

## 2021-01-20 ENCOUNTER — EDUCATION (OUTPATIENT)
Dept: ORTHOPEDICS | Facility: CLINIC | Age: 41
End: 2021-01-20

## 2021-01-26 ENCOUNTER — TELEPHONE (OUTPATIENT)
Dept: ORTHOPEDICS | Facility: CLINIC | Age: 41
End: 2021-01-26

## 2021-01-29 ENCOUNTER — OFFICE VISIT (OUTPATIENT)
Dept: ORTHOPEDICS | Facility: CLINIC | Age: 41
End: 2021-01-29
Payer: COMMERCIAL

## 2021-01-29 DIAGNOSIS — G56.03 CARPAL TUNNEL SYNDROME, BILATERAL: Primary | ICD-10-CM

## 2021-01-29 PROCEDURE — 99999 PR PBB SHADOW E&M-EST. PATIENT-LVL II: ICD-10-PCS | Mod: PBBFAC,,, | Performed by: ORTHOPAEDIC SURGERY

## 2021-01-29 PROCEDURE — 1126F AMNT PAIN NOTED NONE PRSNT: CPT | Mod: S$GLB,,, | Performed by: ORTHOPAEDIC SURGERY

## 2021-01-29 PROCEDURE — 99024 PR POST-OP FOLLOW-UP VISIT: ICD-10-PCS | Mod: S$GLB,,, | Performed by: ORTHOPAEDIC SURGERY

## 2021-01-29 PROCEDURE — 99999 PR PBB SHADOW E&M-EST. PATIENT-LVL II: CPT | Mod: PBBFAC,,, | Performed by: ORTHOPAEDIC SURGERY

## 2021-01-29 PROCEDURE — 99024 POSTOP FOLLOW-UP VISIT: CPT | Mod: S$GLB,,, | Performed by: ORTHOPAEDIC SURGERY

## 2021-01-29 PROCEDURE — 1126F PR PAIN SEVERITY QUANTIFIED, NO PAIN PRESENT: ICD-10-PCS | Mod: S$GLB,,, | Performed by: ORTHOPAEDIC SURGERY

## 2021-02-24 ENCOUNTER — OFFICE VISIT (OUTPATIENT)
Dept: SURGERY | Facility: CLINIC | Age: 41
End: 2021-02-24
Payer: COMMERCIAL

## 2021-02-24 VITALS
HEART RATE: 68 BPM | SYSTOLIC BLOOD PRESSURE: 107 MMHG | DIASTOLIC BLOOD PRESSURE: 56 MMHG | WEIGHT: 154.63 LBS | HEIGHT: 66 IN | BODY MASS INDEX: 24.85 KG/M2

## 2021-02-24 DIAGNOSIS — K64.2 PROLAPSED INTERNAL HEMORRHOIDS, GRADE 3: ICD-10-CM

## 2021-02-24 DIAGNOSIS — K64.8 BLEEDING INTERNAL HEMORRHOIDS: Primary | ICD-10-CM

## 2021-02-24 PROCEDURE — 1126F AMNT PAIN NOTED NONE PRSNT: CPT | Mod: S$GLB,,, | Performed by: COLON & RECTAL SURGERY

## 2021-02-24 PROCEDURE — 99213 PR OFFICE/OUTPT VISIT, EST, LEVL III, 20-29 MIN: ICD-10-PCS | Mod: S$GLB,,, | Performed by: COLON & RECTAL SURGERY

## 2021-02-24 PROCEDURE — 3008F PR BODY MASS INDEX (BMI) DOCUMENTED: ICD-10-PCS | Mod: CPTII,S$GLB,, | Performed by: COLON & RECTAL SURGERY

## 2021-02-24 PROCEDURE — 99213 OFFICE O/P EST LOW 20 MIN: CPT | Mod: S$GLB,,, | Performed by: COLON & RECTAL SURGERY

## 2021-02-24 PROCEDURE — 1126F PR PAIN SEVERITY QUANTIFIED, NO PAIN PRESENT: ICD-10-PCS | Mod: S$GLB,,, | Performed by: COLON & RECTAL SURGERY

## 2021-02-24 PROCEDURE — 3008F BODY MASS INDEX DOCD: CPT | Mod: CPTII,S$GLB,, | Performed by: COLON & RECTAL SURGERY

## 2021-02-24 PROCEDURE — 99999 PR PBB SHADOW E&M-EST. PATIENT-LVL III: ICD-10-PCS | Mod: PBBFAC,,, | Performed by: COLON & RECTAL SURGERY

## 2021-02-24 PROCEDURE — 99999 PR PBB SHADOW E&M-EST. PATIENT-LVL III: CPT | Mod: PBBFAC,,, | Performed by: COLON & RECTAL SURGERY

## 2021-03-01 ENCOUNTER — OFFICE VISIT (OUTPATIENT)
Dept: ORTHOPEDICS | Facility: CLINIC | Age: 41
End: 2021-03-01
Payer: COMMERCIAL

## 2021-03-01 VITALS — WEIGHT: 157.88 LBS | BODY MASS INDEX: 25.48 KG/M2

## 2021-03-01 DIAGNOSIS — G56.03 CARPAL TUNNEL SYNDROME, BILATERAL: Primary | ICD-10-CM

## 2021-03-01 PROCEDURE — 3008F PR BODY MASS INDEX (BMI) DOCUMENTED: ICD-10-PCS | Mod: CPTII,S$GLB,, | Performed by: ORTHOPAEDIC SURGERY

## 2021-03-01 PROCEDURE — 3008F BODY MASS INDEX DOCD: CPT | Mod: CPTII,S$GLB,, | Performed by: ORTHOPAEDIC SURGERY

## 2021-03-01 PROCEDURE — 99999 PR PBB SHADOW E&M-EST. PATIENT-LVL II: CPT | Mod: PBBFAC,,, | Performed by: ORTHOPAEDIC SURGERY

## 2021-03-01 PROCEDURE — 99024 PR POST-OP FOLLOW-UP VISIT: ICD-10-PCS | Mod: S$GLB,,, | Performed by: ORTHOPAEDIC SURGERY

## 2021-03-01 PROCEDURE — 99024 POSTOP FOLLOW-UP VISIT: CPT | Mod: S$GLB,,, | Performed by: ORTHOPAEDIC SURGERY

## 2021-03-01 PROCEDURE — 99999 PR PBB SHADOW E&M-EST. PATIENT-LVL II: ICD-10-PCS | Mod: PBBFAC,,, | Performed by: ORTHOPAEDIC SURGERY

## 2021-10-05 NOTE — TELEPHONE ENCOUNTER
----- Message from Sabrina Resendiz sent at 1/23/2017  1:01 PM CST -----  Contact: Pt  Please add u/s order to appt on 02/06/17.  
n/a

## 2022-04-11 NOTE — PROGRESS NOTES
Kathy Vera presents for follow-up of her bilateral carpal tunnel syndrome.  She has had an EMG/NCS which showed moderate right carpal tunnel syndrome and mild left carpal tunnel syndrome.  She reports continued numbness and pain in bilateral hands despite wearing nighttime braces.      PE:    AA&O x 4.  NAD  HEENT:  NCAT, sclera nonicteric  Lungs:  Respirations are equal and unlabored.  CV:  2+ bilateral upper and lower extremity pulses.  MSK: Digits WWP, brisk CR < 3s throughout  NVI motor/LTS to M/R/U nerves, radial pulse 2+  Tinel's Test - Carpal Tunnel                Pos bilateral  Tinel's Test - Cubital Tunnel               Neg  Phalen's Test                                      Pos bilateral  Median Nerve Compression Test       Pos bilateral   strength normal     Bilateral ROM hand/wrist/elbow full, painless     A/P:  Bilateral carpal tunnel syndrome    We have discussed conservative vs. surgical treatment as well as risks, benefits and alternatives for carpal tunnel syndrome.  She has exhausted conservative measures and would like to proceed with surgery. Surgery would include right carpal tunnel release and left carpal tunnel steroid injection.     We have discussed risks of hand surgery which include but are not limited to blood clots in the legs that can travel to the lungs (pulmonary embolism). Pulmonary embolism can cause shortness of breath, chest pain, and even shock. Other risks include urinary tract infection, nausea and vomiting (usually related to pain medication), chronic pain, bleeding, nerve damage, blood vessel injury, scarring and infection of the hand which can require re-operation. Furthermore, the risks of anesthesia include potential heart, lung, kidney, and liver damage.  Informed consent was obtained.  She understands and would like to proceed with surgery in the near future.     Call with any questions/concerns in the interim     Please be aware that this note has been generated  with the assistance of MModal voice-to-text.  Please excuse any spelling or grammatical errors.       Griseofulvin Pregnancy And Lactation Text: This medication is Pregnancy Category X and is known to cause serious birth defects. It is unknown if this medication is excreted in breast milk but breast feeding should be avoided.

## 2022-04-20 ENCOUNTER — PATIENT MESSAGE (OUTPATIENT)
Dept: ORTHOPEDICS | Facility: CLINIC | Age: 42
End: 2022-04-20
Payer: COMMERCIAL

## 2022-06-15 NOTE — TELEPHONE ENCOUNTER
Spoke with patient today. Scheduled surgery for 1/5/2021. Informed her about COVID test 72 hours prior which would be on 1/2/2021. I will call her next week to remind her.  ----- Message from Keny Caceres sent at 12/23/2020 10:49 AM CST -----  Regarding: Procedure  Contact: Self/ [phone]  Patient requesting to speak with you regarding getting her carpal tunnel procedure scheduled. Please call and advise.        No difficulties

## 2023-07-08 ENCOUNTER — PATIENT MESSAGE (OUTPATIENT)
Dept: SURGERY | Facility: CLINIC | Age: 43
End: 2023-07-08
Payer: COMMERCIAL

## 2024-06-20 ENCOUNTER — TELEPHONE (OUTPATIENT)
Dept: SURGERY | Facility: CLINIC | Age: 44
End: 2024-06-20
Payer: COMMERCIAL

## 2024-06-20 ENCOUNTER — PATIENT MESSAGE (OUTPATIENT)
Dept: SURGERY | Facility: CLINIC | Age: 44
End: 2024-06-20
Payer: COMMERCIAL

## 2024-06-20 NOTE — TELEPHONE ENCOUNTER
----- Message from Sandra Lay RN sent at 6/20/2024  3:13 PM CDT -----  Regarding: FW: colonoscopy    ----- Message -----  From: Glenda Huff  Sent: 6/20/2024   3:05 PM CDT  To: [facility] Staff  Subject: colonoscopy                                      PATIENT CALL    Pt called to speak w/ Aditi regarding colonoscopy. She had records sent from [facility]. Would like to know if it was received on our end and proceed w/ scheduling. Please call back at [phone]

## 2024-07-08 ENCOUNTER — PATIENT MESSAGE (OUTPATIENT)
Dept: HEMATOLOGY/ONCOLOGY | Facility: CLINIC | Age: 44
End: 2024-07-08
Payer: COMMERCIAL

## (undated) DEVICE — PAD CAST 2 IN X 4YDS STERILE

## (undated) DEVICE — GAUZE SPONGE BULKEE 6X6.75IN

## (undated) DEVICE — DRAPE STERI-DRAPE 1000 17X11IN

## (undated) DEVICE — DRESSING LEUKOPLAST FLEX 1X3IN

## (undated) DEVICE — SOL 9P NACL IRR PIC IL

## (undated) DEVICE — Device

## (undated) DEVICE — KNIFE CARPAL TUNNEL

## (undated) DEVICE — STOCKINETTE DBL PLY ST 4X

## (undated) DEVICE — SPONGE GAUZE 4X4 12 PLY STRL

## (undated) DEVICE — DRESSING N ADH OIL EMUL 3X3

## (undated) DEVICE — SEE MEDLINE ITEM 152528

## (undated) DEVICE — SUT PROLENE 3-0 FS-2 18

## (undated) DEVICE — TOURNIQUET SB QC SP 18X4IN

## (undated) DEVICE — NDL SAFETY 22G X 1.5 ECLIPSE

## (undated) DEVICE — NDL 18GA X1 1/2 REG BEVEL

## (undated) DEVICE — GLOVE BIOGEL PI MICRO SZ 7